# Patient Record
Sex: FEMALE | Race: WHITE | NOT HISPANIC OR LATINO | URBAN - METROPOLITAN AREA
[De-identification: names, ages, dates, MRNs, and addresses within clinical notes are randomized per-mention and may not be internally consistent; named-entity substitution may affect disease eponyms.]

---

## 2017-10-29 ENCOUNTER — EMERGENCY (EMERGENCY)
Facility: HOSPITAL | Age: 27
LOS: 0 days | Discharge: HOME | End: 2017-10-29

## 2017-10-29 DIAGNOSIS — S01.111A LACERATION WITHOUT FOREIGN BODY OF RIGHT EYELID AND PERIOCULAR AREA, INITIAL ENCOUNTER: ICD-10-CM
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
## 2017-10-29 DIAGNOSIS — E11.9 TYPE 2 DIABETES MELLITUS WITHOUT COMPLICATIONS: ICD-10-CM

## 2017-10-29 DIAGNOSIS — Z79.899 OTHER LONG TERM (CURRENT) DRUG THERAPY: ICD-10-CM
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
## 2018-02-02 ENCOUNTER — EMERGENCY (EMERGENCY)
Facility: HOSPITAL | Age: 28
LOS: 0 days | Discharge: HOME | End: 2018-02-03

## 2018-02-02 DIAGNOSIS — E11.9 TYPE 2 DIABETES MELLITUS WITHOUT COMPLICATIONS: ICD-10-CM

## 2018-02-02 DIAGNOSIS — Z79.84 LONG TERM (CURRENT) USE OF ORAL HYPOGLYCEMIC DRUGS: ICD-10-CM

## 2018-02-02 DIAGNOSIS — Z79.899 OTHER LONG TERM (CURRENT) DRUG THERAPY: ICD-10-CM

## 2018-02-02 DIAGNOSIS — N76.5 ULCERATION OF VAGINA: ICD-10-CM

## 2018-04-17 ENCOUNTER — OUTPATIENT (OUTPATIENT)
Dept: OUTPATIENT SERVICES | Facility: HOSPITAL | Age: 28
LOS: 1 days | Discharge: HOME | End: 2018-04-17

## 2018-04-17 DIAGNOSIS — F11.20 OPIOID DEPENDENCE, UNCOMPLICATED: ICD-10-CM

## 2018-05-07 ENCOUNTER — OUTPATIENT (OUTPATIENT)
Dept: OUTPATIENT SERVICES | Facility: HOSPITAL | Age: 28
LOS: 1 days | Discharge: HOME | End: 2018-05-07

## 2018-05-07 DIAGNOSIS — F11.20 OPIOID DEPENDENCE, UNCOMPLICATED: ICD-10-CM

## 2018-06-29 ENCOUNTER — OUTPATIENT (OUTPATIENT)
Dept: OUTPATIENT SERVICES | Facility: HOSPITAL | Age: 28
LOS: 1 days | Discharge: HOME | End: 2018-06-29

## 2018-06-29 DIAGNOSIS — F11.20 OPIOID DEPENDENCE, UNCOMPLICATED: ICD-10-CM

## 2018-08-03 ENCOUNTER — OUTPATIENT (OUTPATIENT)
Dept: OUTPATIENT SERVICES | Facility: HOSPITAL | Age: 28
LOS: 1 days | Discharge: HOME | End: 2018-08-03

## 2018-08-03 DIAGNOSIS — F11.20 OPIOID DEPENDENCE, UNCOMPLICATED: ICD-10-CM

## 2018-09-07 ENCOUNTER — OUTPATIENT (OUTPATIENT)
Dept: OUTPATIENT SERVICES | Facility: HOSPITAL | Age: 28
LOS: 1 days | Discharge: HOME | End: 2018-09-07

## 2018-09-07 DIAGNOSIS — F11.20 OPIOID DEPENDENCE, UNCOMPLICATED: ICD-10-CM

## 2018-10-26 ENCOUNTER — OUTPATIENT (OUTPATIENT)
Dept: OUTPATIENT SERVICES | Facility: HOSPITAL | Age: 28
LOS: 1 days | Discharge: HOME | End: 2018-10-26

## 2018-10-26 DIAGNOSIS — F11.20 OPIOID DEPENDENCE, UNCOMPLICATED: ICD-10-CM

## 2018-11-30 ENCOUNTER — OUTPATIENT (OUTPATIENT)
Dept: OUTPATIENT SERVICES | Facility: HOSPITAL | Age: 28
LOS: 1 days | Discharge: HOME | End: 2018-11-30

## 2018-11-30 DIAGNOSIS — F11.20 OPIOID DEPENDENCE, UNCOMPLICATED: ICD-10-CM

## 2018-12-28 ENCOUNTER — OUTPATIENT (OUTPATIENT)
Dept: OUTPATIENT SERVICES | Facility: HOSPITAL | Age: 28
LOS: 1 days | Discharge: HOME | End: 2018-12-28

## 2018-12-28 DIAGNOSIS — F11.20 OPIOID DEPENDENCE, UNCOMPLICATED: ICD-10-CM

## 2019-01-04 ENCOUNTER — OUTPATIENT (OUTPATIENT)
Dept: OUTPATIENT SERVICES | Facility: HOSPITAL | Age: 29
LOS: 1 days | Discharge: HOME | End: 2019-01-04

## 2019-01-04 DIAGNOSIS — F11.20 OPIOID DEPENDENCE, UNCOMPLICATED: ICD-10-CM

## 2019-01-09 ENCOUNTER — OUTPATIENT (OUTPATIENT)
Dept: OUTPATIENT SERVICES | Facility: HOSPITAL | Age: 29
LOS: 1 days | Discharge: HOME | End: 2019-01-09

## 2019-01-09 DIAGNOSIS — F11.20 OPIOID DEPENDENCE, UNCOMPLICATED: ICD-10-CM

## 2019-01-11 ENCOUNTER — OUTPATIENT (OUTPATIENT)
Dept: OUTPATIENT SERVICES | Facility: HOSPITAL | Age: 29
LOS: 1 days | Discharge: HOME | End: 2019-01-11

## 2019-01-11 DIAGNOSIS — F11.20 OPIOID DEPENDENCE, UNCOMPLICATED: ICD-10-CM

## 2019-01-17 ENCOUNTER — OUTPATIENT (OUTPATIENT)
Dept: OUTPATIENT SERVICES | Facility: HOSPITAL | Age: 29
LOS: 1 days | Discharge: HOME | End: 2019-01-17

## 2019-01-17 DIAGNOSIS — F11.20 OPIOID DEPENDENCE, UNCOMPLICATED: ICD-10-CM

## 2019-01-25 ENCOUNTER — OUTPATIENT (OUTPATIENT)
Dept: OUTPATIENT SERVICES | Facility: HOSPITAL | Age: 29
LOS: 1 days | Discharge: HOME | End: 2019-01-25

## 2019-01-25 DIAGNOSIS — F11.20 OPIOID DEPENDENCE, UNCOMPLICATED: ICD-10-CM

## 2019-02-01 ENCOUNTER — OUTPATIENT (OUTPATIENT)
Dept: OUTPATIENT SERVICES | Facility: HOSPITAL | Age: 29
LOS: 1 days | Discharge: HOME | End: 2019-02-01

## 2019-02-01 DIAGNOSIS — F11.20 OPIOID DEPENDENCE, UNCOMPLICATED: ICD-10-CM

## 2019-02-08 ENCOUNTER — OUTPATIENT (OUTPATIENT)
Dept: OUTPATIENT SERVICES | Facility: HOSPITAL | Age: 29
LOS: 1 days | Discharge: HOME | End: 2019-02-08

## 2019-02-08 DIAGNOSIS — F11.20 OPIOID DEPENDENCE, UNCOMPLICATED: ICD-10-CM

## 2019-02-15 ENCOUNTER — OUTPATIENT (OUTPATIENT)
Dept: OUTPATIENT SERVICES | Facility: HOSPITAL | Age: 29
LOS: 1 days | Discharge: HOME | End: 2019-02-15

## 2019-02-15 DIAGNOSIS — F11.20 OPIOID DEPENDENCE, UNCOMPLICATED: ICD-10-CM

## 2019-02-22 ENCOUNTER — OUTPATIENT (OUTPATIENT)
Dept: OUTPATIENT SERVICES | Facility: HOSPITAL | Age: 29
LOS: 1 days | Discharge: HOME | End: 2019-02-22

## 2019-02-22 DIAGNOSIS — F11.20 OPIOID DEPENDENCE, UNCOMPLICATED: ICD-10-CM

## 2019-03-01 ENCOUNTER — OUTPATIENT (OUTPATIENT)
Dept: OUTPATIENT SERVICES | Facility: HOSPITAL | Age: 29
LOS: 1 days | Discharge: HOME | End: 2019-03-01

## 2019-03-01 DIAGNOSIS — F11.20 OPIOID DEPENDENCE, UNCOMPLICATED: ICD-10-CM

## 2019-03-04 ENCOUNTER — EMERGENCY (EMERGENCY)
Facility: HOSPITAL | Age: 29
LOS: 0 days | Discharge: HOME | End: 2019-03-05
Attending: EMERGENCY MEDICINE | Admitting: EMERGENCY MEDICINE

## 2019-03-04 VITALS
HEART RATE: 99 BPM | SYSTOLIC BLOOD PRESSURE: 125 MMHG | OXYGEN SATURATION: 98 % | DIASTOLIC BLOOD PRESSURE: 82 MMHG | RESPIRATION RATE: 18 BRPM | TEMPERATURE: 99 F

## 2019-03-04 DIAGNOSIS — F17.200 NICOTINE DEPENDENCE, UNSPECIFIED, UNCOMPLICATED: ICD-10-CM

## 2019-03-04 DIAGNOSIS — S02.2XXA FRACTURE OF NASAL BONES, INITIAL ENCOUNTER FOR CLOSED FRACTURE: ICD-10-CM
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
## 2019-03-04 DIAGNOSIS — S01.111A LACERATION WITHOUT FOREIGN BODY OF RIGHT EYELID AND PERIOCULAR AREA, INITIAL ENCOUNTER: ICD-10-CM
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
## 2019-03-04 DIAGNOSIS — T74.11XA ADULT PHYSICAL ABUSE, CONFIRMED, INITIAL ENCOUNTER: ICD-10-CM
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
## 2019-03-04 RX ORDER — ACETAMINOPHEN 500 MG
650 TABLET ORAL ONCE
Qty: 0 | Refills: 0 | Status: COMPLETED | OUTPATIENT
Start: 2019-03-04 | End: 2019-03-04

## 2019-03-04 RX ORDER — TETANUS TOXOID, REDUCED DIPHTHERIA TOXOID AND ACELLULAR PERTUSSIS VACCINE, ADSORBED 5; 2.5; 8; 8; 2.5 [IU]/.5ML; [IU]/.5ML; UG/.5ML; UG/.5ML; UG/.5ML
0.5 SUSPENSION INTRAMUSCULAR ONCE
Qty: 0 | Refills: 0 | Status: DISCONTINUED | OUTPATIENT
Start: 2019-03-04 | End: 2019-03-04

## 2019-03-04 RX ADMIN — Medication 650 MILLIGRAM(S): at 22:14

## 2019-03-04 NOTE — ED PROVIDER NOTE - NSFOLLOWUPINSTRUCTIONS_ED_ALL_ED_FT
Laceration    A laceration is a cut that goes through all of the layers of the skin and into the tissue that is right under the skin. Some lacerations heal on their own. Others need to be closed with skin adhesive strips, skin glue, stitches (sutures), or staples. Proper laceration care minimizes the risk of infection and helps the laceration to heal better.  If non-absorbable stitches or staples have been placed, they must be taken out within the time frame instructed by your healthcare provider.    SEEK IMMEDIATE MEDICAL CARE IF YOU HAVE ANY OF THE FOLLOWING SYMPTOMS: swelling around the wound, worsening pain, drainage from the wound, red streaking going away from your wound, inability to move finger or toe near the laceration, or discoloration of skin near the laceration.     Fracture    A fracture is a break in one of your bones. This can occur from a variety of injuries, especially traumatic ones. Symptoms include pain, bruising, or swelling. Do not use the injured limb. If a fracture is in one of the bones below your waist, do not put weight on that limb unless instructed to do so by your healthcare provider. Crutches or a cane may have been provided. A splint or cast may have been applied by your health care provider. Make sure to keep it dry and follow up with an orthopedist as instructed.    SEEK IMMEDIATE MEDICAL CARE IF YOU HAVE ANY OF THE FOLLOWING SYMPTOMS: numbness, tingling, increasing pain, or weakness in any part of the injured limb.

## 2019-03-04 NOTE — ED PROVIDER NOTE - CARE PROVIDER_API CALL
Melecio Gamez (MD)  [facility]  [address] [place] [address], [address] [place] [address]  Phone: [phone]  Fax: [phone]  Follow Up Time: 1-3 Days    Ej Costello)  Ophthalmology  [facility] [facility] [address]  [place] [address]  Phone: [phone]  Fax: [phone]  Follow Up Time: 1-3 Days
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
## 2019-03-04 NOTE — ED ADULT NURSE NOTE - NSIMPLEMENTINTERV_GEN_ALL_ED
Implemented All Universal Safety Interventions:  [place] to call system. Call bell, personal items and telephone within reach. Instruct patient to call for assistance. Room bathroom lighting operational. Non-slip footwear when patient is off stretcher. Physically safe environment: no spills, clutter or unnecessary equipment. Stretcher in lowest position, wheels locked, appropriate side rails in place.
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
## 2019-03-04 NOTE — ED PROVIDER NOTE - PROVIDER TOKENS
PROVIDER:[TOKEN:[[id number]],FOLLOWUP:[1-3 Days]],PROVIDER:[TOKEN:[[id number]],FOLLOWUP:[1-3 Days]]
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
## 2019-03-04 NOTE — ED PROVIDER NOTE - CARE PROVIDERS DIRECT ADDRESSES
,[email or web address]

## 2019-03-04 NOTE — ED ADULT TRIAGE NOTE - RESPIRATORY RATE (BREATHS/MIN)
Immediate Post- Operative Note        PostOp Diagnosis: Multifocal HCC.       Procedure(s): Pre Y90 visceral angiogram mapping and MAA hepatopulmonary shunt study.       Estimated Blood Loss: Less than 15 ml        Complications: None            8/28/2018     1508 PM     Joe Norris        
18
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
## 2019-03-04 NOTE — ED PROVIDER NOTE - NS ED ROS FT
Review of Systems  Constitutional:  No fever, chills.  Eyes:  No visual changes, or discharge. (+) right eye pain, eyelid laceration  ENMT:  No hearing changes, pain, or discharge. No nasal congestion, discharge, or bleeding. No throat pain, swelling, or difficulty swallowing.  Cardiac:  No chest pain, palpitations.  Respiratory:  No dyspnea, orthopnea, stridor, wheezing, or cough. No hemoptysis.  GI:  No nausea, vomiting, diarrhea, or abdominal pain.   MS:  No back pain.  Skin:  No skin rash, pruritis, jaundice, or lesions.  Neuro:  No headache, dizziness, loss of sensation, or focal weakness.  No change in mental status.   Endocrine: No history of thyroid disease or diabetes.
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
## 2019-03-04 NOTE — ED PROVIDER NOTE - PHYSICAL EXAMINATION
VITAL SIGNS: I have reviewed nursing notes and confirm.  CONSTITUTIONAL: Well-developed; well-nourished; in no acute distress.  SKIN: Skin exam is warm and dry, no acute rash.  HEAD: Normocephalic; atraumatic.  EYES: Conjunctiva and sclera clear. (+) right periorbital ecchymosis. (+) TTP to right inferior orbital rim. (+) 2 lacerations to right eyelid with mild active bleeding. EOMI. No entrapment. VA 20/20 B/L.   ENT: No nasal discharge; airway clear. TMs clear. No septal hematoma.   NECK: Supple; non tender.  CARD: S1, S2 normal; no murmurs, gallops, or rubs. Regular rate and rhythm.  RESP: No wheezes, rales or rhonchi. Speaking in full sentences.   ABD: Normal bowel sounds; soft; non-distended; non-tender; No rebound or guarding. No CVA tenderness.  EXT: Normal ROM. Pelvis stable.   NEURO: Alert, oriented. Grossly unremarkable. No focal deficits. CN II-XII intact. No dysmetria. No ataxia. Sensation intact and equal throughout. Strength 5/5 throughout. Gait steady.

## 2019-03-05 NOTE — ED PROCEDURE NOTE - ATTENDING CONTRIBUTION TO CARE
I was present for and supervised the key/critical aspects of the procedures performed during the care of the patient.--laceration #2 repair

## 2019-03-05 NOTE — ED PROCEDURE NOTE - ATTENDING CONTRIBUTION TO CARE
I was present for and supervised the key/critical aspects of the procedures performed during the care of the patient--laceration repair

## 2019-03-08 ENCOUNTER — OUTPATIENT (OUTPATIENT)
Dept: OUTPATIENT SERVICES | Facility: HOSPITAL | Age: 29
LOS: 1 days | Discharge: HOME | End: 2019-03-08

## 2019-03-08 DIAGNOSIS — F11.20 OPIOID DEPENDENCE, UNCOMPLICATED: ICD-10-CM

## 2019-03-15 ENCOUNTER — OUTPATIENT (OUTPATIENT)
Dept: OUTPATIENT SERVICES | Facility: HOSPITAL | Age: 29
LOS: 1 days | Discharge: HOME | End: 2019-03-15

## 2019-03-15 DIAGNOSIS — F11.20 OPIOID DEPENDENCE, UNCOMPLICATED: ICD-10-CM

## 2019-03-29 ENCOUNTER — OUTPATIENT (OUTPATIENT)
Dept: OUTPATIENT SERVICES | Facility: HOSPITAL | Age: 29
LOS: 1 days | Discharge: HOME | End: 2019-03-29

## 2019-03-29 DIAGNOSIS — F11.20 OPIOID DEPENDENCE, UNCOMPLICATED: ICD-10-CM

## 2019-04-05 ENCOUNTER — OUTPATIENT (OUTPATIENT)
Dept: OUTPATIENT SERVICES | Facility: HOSPITAL | Age: 29
LOS: 1 days | Discharge: HOME | End: 2019-04-05

## 2019-04-05 DIAGNOSIS — F11.20 OPIOID DEPENDENCE, UNCOMPLICATED: ICD-10-CM

## 2019-04-12 ENCOUNTER — OUTPATIENT (OUTPATIENT)
Dept: OUTPATIENT SERVICES | Facility: HOSPITAL | Age: 29
LOS: 1 days | Discharge: HOME | End: 2019-04-12

## 2019-04-12 DIAGNOSIS — F11.20 OPIOID DEPENDENCE, UNCOMPLICATED: ICD-10-CM

## 2019-04-19 ENCOUNTER — OUTPATIENT (OUTPATIENT)
Dept: OUTPATIENT SERVICES | Facility: HOSPITAL | Age: 29
LOS: 1 days | Discharge: HOME | End: 2019-04-19

## 2019-04-19 DIAGNOSIS — F11.20 OPIOID DEPENDENCE, UNCOMPLICATED: ICD-10-CM

## 2019-04-26 ENCOUNTER — OUTPATIENT (OUTPATIENT)
Dept: OUTPATIENT SERVICES | Facility: HOSPITAL | Age: 29
LOS: 1 days | Discharge: HOME | End: 2019-04-26

## 2019-04-26 DIAGNOSIS — F11.20 OPIOID DEPENDENCE, UNCOMPLICATED: ICD-10-CM

## 2019-04-30 ENCOUNTER — OUTPATIENT (OUTPATIENT)
Dept: OUTPATIENT SERVICES | Facility: HOSPITAL | Age: 29
LOS: 1 days | Discharge: HOME | End: 2019-04-30

## 2019-04-30 DIAGNOSIS — F11.20 OPIOID DEPENDENCE, UNCOMPLICATED: ICD-10-CM

## 2019-05-03 ENCOUNTER — OUTPATIENT (OUTPATIENT)
Dept: OUTPATIENT SERVICES | Facility: HOSPITAL | Age: 29
LOS: 1 days | Discharge: HOME | End: 2019-05-03

## 2019-05-03 DIAGNOSIS — F11.20 OPIOID DEPENDENCE, UNCOMPLICATED: ICD-10-CM

## 2019-05-10 ENCOUNTER — OUTPATIENT (OUTPATIENT)
Dept: OUTPATIENT SERVICES | Facility: HOSPITAL | Age: 29
LOS: 1 days | Discharge: HOME | End: 2019-05-10

## 2019-05-10 DIAGNOSIS — F11.20 OPIOID DEPENDENCE, UNCOMPLICATED: ICD-10-CM

## 2019-05-31 ENCOUNTER — OUTPATIENT (OUTPATIENT)
Dept: OUTPATIENT SERVICES | Facility: HOSPITAL | Age: 29
LOS: 1 days | Discharge: HOME | End: 2019-05-31

## 2019-05-31 DIAGNOSIS — F11.20 OPIOID DEPENDENCE, UNCOMPLICATED: ICD-10-CM

## 2019-06-13 ENCOUNTER — OUTPATIENT (OUTPATIENT)
Dept: OUTPATIENT SERVICES | Facility: HOSPITAL | Age: 29
LOS: 1 days | Discharge: HOME | End: 2019-06-13

## 2019-06-13 DIAGNOSIS — F11.20 OPIOID DEPENDENCE, UNCOMPLICATED: ICD-10-CM

## 2019-06-21 ENCOUNTER — OUTPATIENT (OUTPATIENT)
Dept: OUTPATIENT SERVICES | Facility: HOSPITAL | Age: 29
LOS: 1 days | Discharge: HOME | End: 2019-06-21

## 2019-06-21 DIAGNOSIS — F11.20 OPIOID DEPENDENCE, UNCOMPLICATED: ICD-10-CM

## 2019-06-28 ENCOUNTER — OUTPATIENT (OUTPATIENT)
Dept: OUTPATIENT SERVICES | Facility: HOSPITAL | Age: 29
LOS: 1 days | Discharge: HOME | End: 2019-06-28

## 2019-06-28 DIAGNOSIS — F11.20 OPIOID DEPENDENCE, UNCOMPLICATED: ICD-10-CM

## 2019-07-05 ENCOUNTER — OUTPATIENT (OUTPATIENT)
Dept: OUTPATIENT SERVICES | Facility: HOSPITAL | Age: 29
LOS: 1 days | Discharge: HOME | End: 2019-07-05

## 2019-07-05 DIAGNOSIS — F11.20 OPIOID DEPENDENCE, UNCOMPLICATED: ICD-10-CM

## 2019-07-12 ENCOUNTER — OUTPATIENT (OUTPATIENT)
Dept: OUTPATIENT SERVICES | Facility: HOSPITAL | Age: 29
LOS: 1 days | Discharge: HOME | End: 2019-07-12

## 2019-07-12 DIAGNOSIS — F11.20 OPIOID DEPENDENCE, UNCOMPLICATED: ICD-10-CM

## 2019-07-19 ENCOUNTER — OUTPATIENT (OUTPATIENT)
Dept: OUTPATIENT SERVICES | Facility: HOSPITAL | Age: 29
LOS: 1 days | Discharge: HOME | End: 2019-07-19

## 2019-07-19 DIAGNOSIS — F11.20 OPIOID DEPENDENCE, UNCOMPLICATED: ICD-10-CM

## 2019-07-22 ENCOUNTER — OUTPATIENT (OUTPATIENT)
Dept: OUTPATIENT SERVICES | Facility: HOSPITAL | Age: 29
LOS: 1 days | Discharge: HOME | End: 2019-07-22
Payer: MEDICAID

## 2019-07-22 DIAGNOSIS — F11.20 OPIOID DEPENDENCE, UNCOMPLICATED: ICD-10-CM

## 2019-07-22 PROCEDURE — 99214 OFFICE O/P EST MOD 30 MIN: CPT

## 2019-08-02 ENCOUNTER — OUTPATIENT (OUTPATIENT)
Dept: OUTPATIENT SERVICES | Facility: HOSPITAL | Age: 29
LOS: 1 days | Discharge: HOME | End: 2019-08-02

## 2019-08-02 DIAGNOSIS — F11.20 OPIOID DEPENDENCE, UNCOMPLICATED: ICD-10-CM

## 2019-08-19 ENCOUNTER — OUTPATIENT (OUTPATIENT)
Dept: OUTPATIENT SERVICES | Facility: HOSPITAL | Age: 29
LOS: 1 days | Discharge: HOME | End: 2019-08-19
Payer: MEDICAID

## 2019-08-19 DIAGNOSIS — F11.20 OPIOID DEPENDENCE, UNCOMPLICATED: ICD-10-CM

## 2019-08-19 PROCEDURE — 99214 OFFICE O/P EST MOD 30 MIN: CPT

## 2019-08-30 ENCOUNTER — OUTPATIENT (OUTPATIENT)
Dept: OUTPATIENT SERVICES | Facility: HOSPITAL | Age: 29
LOS: 1 days | Discharge: HOME | End: 2019-08-30

## 2019-08-30 DIAGNOSIS — F11.20 OPIOID DEPENDENCE, UNCOMPLICATED: ICD-10-CM

## 2019-09-01 ENCOUNTER — OUTPATIENT (OUTPATIENT)
Dept: OUTPATIENT SERVICES | Facility: HOSPITAL | Age: 29
LOS: 1 days | End: 2019-09-01
Payer: MEDICAID

## 2019-09-01 ENCOUNTER — EMERGENCY (EMERGENCY)
Facility: HOSPITAL | Age: 29
LOS: 0 days | Discharge: HOME | End: 2019-09-01
Attending: EMERGENCY MEDICINE | Admitting: EMERGENCY MEDICINE
Payer: MEDICAID

## 2019-09-01 VITALS
WEIGHT: 160.06 LBS | RESPIRATION RATE: 18 BRPM | HEART RATE: 84 BPM | OXYGEN SATURATION: 100 % | HEIGHT: 64 IN | TEMPERATURE: 97 F | SYSTOLIC BLOOD PRESSURE: 114 MMHG | DIASTOLIC BLOOD PRESSURE: 78 MMHG
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
## 2019-09-01 DIAGNOSIS — F12.10 CANNABIS ABUSE, UNCOMPLICATED: ICD-10-CM
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
## 2019-09-01 DIAGNOSIS — T40.2X2A POISONING BY OTHER OPIOIDS, INTENTIONAL SELF-HARM, INITIAL ENCOUNTER: ICD-10-CM

## 2019-09-01 DIAGNOSIS — T42.4X2A POISONING BY BENZODIAZEPINES, INTENTIONAL SELF-HARM, INITIAL ENCOUNTER: ICD-10-CM

## 2019-09-01 DIAGNOSIS — J02.9 ACUTE PHARYNGITIS, UNSPECIFIED: ICD-10-CM

## 2019-09-01 DIAGNOSIS — F14.10 COCAINE ABUSE, UNCOMPLICATED: ICD-10-CM

## 2019-09-01 DIAGNOSIS — F13.180: ICD-10-CM

## 2019-09-01 DIAGNOSIS — F39 UNSPECIFIED MOOD [AFFECTIVE] DISORDER: ICD-10-CM

## 2019-09-01 DIAGNOSIS — T14.91XA SUICIDE ATTEMPT, INITIAL ENCOUNTER: ICD-10-CM

## 2019-09-01 DIAGNOSIS — T40.7X2A: ICD-10-CM

## 2019-09-01 DIAGNOSIS — F10.10 ALCOHOL ABUSE, UNCOMPLICATED: ICD-10-CM
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
## 2019-09-01 DIAGNOSIS — F19.94 OTHER PSYCHOACTIVE SUBSTANCE USE, UNSPECIFIED WITH PSYCHOACTIVE SUBSTANCE-INDUCED MOOD DISORDER: ICD-10-CM

## 2019-09-01 LAB
ALBUMIN SERPL ELPH-MCNC: 4.3 G/DL — SIGNIFICANT CHANGE UP (ref 3.5–5.2)
ALP SERPL-CCNC: 75 U/L — SIGNIFICANT CHANGE UP (ref 30–115)
ALT FLD-CCNC: 17 U/L — SIGNIFICANT CHANGE UP (ref 0–41)
ANION GAP SERPL CALC-SCNC: 10 MMOL/L — SIGNIFICANT CHANGE UP (ref 7–14)
APAP SERPL-MCNC: <5 UG/ML — LOW (ref 10–30)
AST SERPL-CCNC: 19 U/L — SIGNIFICANT CHANGE UP (ref 0–41)
BASOPHILS # BLD AUTO: 0.07 K/UL — SIGNIFICANT CHANGE UP (ref 0–0.2)
BASOPHILS NFR BLD AUTO: 1.2 % — HIGH (ref 0–1)
BILIRUB SERPL-MCNC: 0.4 MG/DL — SIGNIFICANT CHANGE UP (ref 0.2–1.2)
BUN SERPL-MCNC: 19 MG/DL — SIGNIFICANT CHANGE UP (ref 10–20)
CALCIUM SERPL-MCNC: 8.8 MG/DL — SIGNIFICANT CHANGE UP (ref 8.5–10.1)
CHLORIDE SERPL-SCNC: 105 MMOL/L — SIGNIFICANT CHANGE UP (ref 98–110)
CO2 SERPL-SCNC: 25 MMOL/L — SIGNIFICANT CHANGE UP (ref 17–32)
CREAT SERPL-MCNC: 0.6 MG/DL — LOW (ref 0.7–1.5)
EOSINOPHIL # BLD AUTO: 0.3 K/UL — SIGNIFICANT CHANGE UP (ref 0–0.7)
EOSINOPHIL NFR BLD AUTO: 5.3 % — SIGNIFICANT CHANGE UP (ref 0–8)
ETHANOL SERPL-MCNC: <10 MG/DL — SIGNIFICANT CHANGE UP
GLUCOSE SERPL-MCNC: 87 MG/DL — SIGNIFICANT CHANGE UP (ref 70–99)
HCT VFR BLD CALC: 36.9 % — LOW (ref 37–47)
HGB BLD-MCNC: 12.3 G/DL — SIGNIFICANT CHANGE UP (ref 12–16)
IMM GRANULOCYTES NFR BLD AUTO: 0.2 % — SIGNIFICANT CHANGE UP (ref 0.1–0.3)
LYMPHOCYTES # BLD AUTO: 2.19 K/UL — SIGNIFICANT CHANGE UP (ref 1.2–3.4)
LYMPHOCYTES # BLD AUTO: 38.5 % — SIGNIFICANT CHANGE UP (ref 20.5–51.1)
MCHC RBC-ENTMCNC: 30.4 PG — SIGNIFICANT CHANGE UP (ref 27–31)
MCHC RBC-ENTMCNC: 33.3 G/DL — SIGNIFICANT CHANGE UP (ref 32–37)
MCV RBC AUTO: 91.1 FL — SIGNIFICANT CHANGE UP (ref 81–99)
MONOCYTES # BLD AUTO: 0.53 K/UL — SIGNIFICANT CHANGE UP (ref 0.1–0.6)
MONOCYTES NFR BLD AUTO: 9.3 % — SIGNIFICANT CHANGE UP (ref 1.7–9.3)
NEUTROPHILS # BLD AUTO: 2.59 K/UL — SIGNIFICANT CHANGE UP (ref 1.4–6.5)
NEUTROPHILS NFR BLD AUTO: 45.5 % — SIGNIFICANT CHANGE UP (ref 42.2–75.2)
NRBC # BLD: 0 /100 WBCS — SIGNIFICANT CHANGE UP (ref 0–0)
PLATELET # BLD AUTO: 228 K/UL — SIGNIFICANT CHANGE UP (ref 130–400)
POTASSIUM SERPL-MCNC: 3.8 MMOL/L — SIGNIFICANT CHANGE UP (ref 3.5–5)
POTASSIUM SERPL-SCNC: 3.8 MMOL/L — SIGNIFICANT CHANGE UP (ref 3.5–5)
PROT SERPL-MCNC: 6.4 G/DL — SIGNIFICANT CHANGE UP (ref 6–8)
RBC # BLD: 4.05 M/UL — LOW (ref 4.2–5.4)
RBC # FLD: 12.6 % — SIGNIFICANT CHANGE UP (ref 11.5–14.5)
SALICYLATES SERPL-MCNC: <0.3 MG/DL — LOW (ref 4–30)
SODIUM SERPL-SCNC: 140 MMOL/L — SIGNIFICANT CHANGE UP (ref 135–146)
WBC # BLD: 5.69 K/UL — SIGNIFICANT CHANGE UP (ref 4.8–10.8)
WBC # FLD AUTO: 5.69 K/UL — SIGNIFICANT CHANGE UP (ref 4.8–10.8)

## 2019-09-01 PROCEDURE — 99284 EMERGENCY DEPT VISIT MOD MDM: CPT

## 2019-09-01 PROCEDURE — G9001: CPT

## 2019-09-01 PROCEDURE — 71045 X-RAY EXAM CHEST 1 VIEW: CPT | Mod: 26

## 2019-09-01 RX ORDER — DEXAMETHASONE 0.5 MG/5ML
10 ELIXIR ORAL ONCE
Refills: 0 | Status: COMPLETED | OUTPATIENT
Start: 2019-09-01 | End: 2019-09-01

## 2019-09-01 RX ORDER — NALOXONE HYDROCHLORIDE 4 MG/.1ML
0.2 SPRAY NASAL ONCE
Refills: 0 | Status: COMPLETED | OUTPATIENT
Start: 2019-09-01 | End: 2019-09-01

## 2019-09-01 RX ORDER — BUPRENORPHINE AND NALOXONE 2; .5 MG/1; MG/1
2 TABLET SUBLINGUAL ONCE
Refills: 0 | Status: DISCONTINUED | OUTPATIENT
Start: 2019-09-01 | End: 2019-09-01

## 2019-09-01 RX ORDER — BUPRENORPHINE AND NALOXONE 2; .5 MG/1; MG/1
1 TABLET SUBLINGUAL ONCE
Refills: 0 | Status: DISCONTINUED | OUTPATIENT
Start: 2019-09-01 | End: 2019-09-01

## 2019-09-01 RX ADMIN — NALOXONE HYDROCHLORIDE 0.2 MILLIGRAM(S): 4 SPRAY NASAL at 05:44

## 2019-09-01 RX ADMIN — Medication 1 TABLET(S): at 11:43

## 2019-09-01 RX ADMIN — Medication 10 MILLIGRAM(S): at 11:43

## 2019-09-01 RX ADMIN — BUPRENORPHINE AND NALOXONE 2 TABLET(S): 2; .5 TABLET SUBLINGUAL at 14:20

## 2019-09-01 RX ADMIN — NALOXONE HYDROCHLORIDE 0.2 MILLIGRAM(S): 4 SPRAY NASAL at 06:31

## 2019-09-01 NOTE — ED PROVIDER NOTE - NSFOLLOWUPCLINICS_GEN_ALL_ED_FT
[facility]  OP Mental Health  [address] [place] [id number]  Phone: [phone]  Fax:   Follow Up Time: 1-3 Days

## 2019-09-01 NOTE — ED PROVIDER NOTE - NSFOLLOWUPINSTRUCTIONS_ED_ALL_ED_FT
Substance Use Disorder  Substance use disorder happens when a person's repeated use of drugs or alcohol interferes with his or her ability to be productive. This disorder can cause problems with your mental and physical health. It can affect your ability to have healthy relationships, and it can keep you from being able to meet your responsibilities at work, home, or school. It can also lead to addiction.    The most commonly abused substances include:    Alcohol.  Tobacco.  Marijuana.  Stimulants, such as cocaine and methamphetamine.  Hallucinogens, such as LSD and PCP.  Opioids, such as some prescription pain medicines and heroin.    What are the causes?  This condition may develop due to social, psychological, or physical reasons. Causes include:    Stress.  Abuse.  Peer pressure.  Anxiety.  Depression.    What increases the risk?  This condition is more likely to develop in people who:    Are stressed.  Have been abused.  Have a mental health disorder, such as depression.  Are born with certain genes.    What are the signs or symptoms?  Symptoms of this condition include:    Using the substance for longer periods of time or at a higher dosage than what is normal or intended.  Having a lasting desire to use the substance.  Being unable to slow down or stop your use of the substance.  Spending an abnormal amount of time seeking the substance, using the substance, or recovering from using the substance.  Craving the substance.  Substance use that:    Interferes with your work, school, or home life.  Interferes with your personal and social relationships.  Makes you give up activities that you once enjoyed or found important.    Using the substance even though:    You know it is dangerous or bad for your health.  You know it is causing problems in your life.    Needing more and more of the substance to get the same effect (developing tolerance).  Experiencing physical symptoms if you do not use the substance (withdrawal).  Using the substance to avoid withdrawal.    How is this diagnosed?  This condition may be diagnosed based on:    Your history of substance use.  The way in which substance abuse affects your life.  Having at least two symptoms of substance use disorder within a 12-month period.    Your health care provider may also test your blood and urine for alcohol and drugs.    How is this treated?  ImageThis condition may be treated by:    Stopping substance use safely. This may require taking medicines and being closely observed for several days.  Taking part in group and individual counseling from mental health providers who help people with substance use disorder.  Staying at a residential treatment center for several days or weeks.  Attending daily counseling sessions at a treatment center.  Taking medicine as told by your health care provider:    To ease symptoms and prevent complications during withdrawal.  To treat other mental health issues, such as depression or anxiety.  To block cravings by causing the same effects as the substance.  To block the effects of the substance or replace good sensations with unpleasant ones.    Going to a support group to share your experience with others who are going through the same thing. These groups are an important part of long-term recovery for many people. They include 12-step programs like Alcoholics Anonymous and Narcotics Anonymous.    Recovery can be a long process. Many people who undergo treatment start using the substance again after stopping. This is called a relapse. If you have a relapse, that does not mean that treatment will not work.    Follow these instructions at home:  Take over-the-counter and prescription medicines only as told by your health care provider.  Do not use any drugs or alcohol.  Keep all follow-up visits as told by your health care provider. This is important. This includes continuing to work with therapists, health care providers, and support groups.  Contact a health care provider if:  You cannot take your medicines as told.  Your symptoms get worse.  You have trouble resisting the urge to use drugs or alcohol.  Get help right away if:  You have serious thoughts about hurting yourself or someone else.  You have a relapse.  This information is not intended to replace advice given to you by your health care provider. Make sure you discuss any questions you have with your health care provider.    Please check this website for help finding local Buprenorphine (Suboxone) providers or to find out if your PMD can prescribe Buprenorphine (Suboxone).  https://[email or web address].gov/medication-assisted-treatment/practitioner-program-data/treatment-practitioner-      Pharyngitis    Pharyngitis is inflammation of your pharynx, which is typically caused by a viral or bacterial infection. Pharyngitis can be contagious and may spread from person to person through intimate contact, coughing, sneezing, or sharing personal items and utensils. Symptoms of pharyngitis may include sore throat, fever, headache, or swollen lymph nodes. If you are prescribed antibiotics, make sure you finish them even if you start to feel better. Gargle with salt water as often as every 1-2 hours to soothe your throat. Throat lozenges (if you are not at risk for choking) or sprays may be used to soothe your throat.    SEEK IMMEDIATE MEDICAL CARE IF YOU HAVE ANY OF THE FOLLOWING SYMPTOMS: neck stiffness, drooling, hoarseness or change in voice, inability to swallow liquids, vomiting, or trouble breathing.
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
## 2019-09-01 NOTE — ED PROVIDER NOTE - PATIENT PORTAL LINK FT
You can access the FollowMyHealth Patient Portal offered by [facility] by registering at the following website: [email or web address]. By joining [employer]’s FollowMyHealth portal, you will also be able to view your health information using other applications (apps) compatible with our system.
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
## 2019-09-01 NOTE — ED BEHAVIORAL HEALTH ASSESSMENT NOTE - DETAILS
pt has sucidal thought of killing her self by running car gas in car garage maternal uncle  has bipolar and grand mother  and mother has depression called [facility] for bed availability agreed

## 2019-09-01 NOTE — ED ADULT NURSE REASSESSMENT NOTE - NS ED NURSE REASSESS COMMENT FT1
evie from relay program came to see pt. pt was too lethargic to answer questions. will educate pt with narcan kit once more awake.

## 2019-09-01 NOTE — ED BEHAVIORAL HEALTH ASSESSMENT NOTE - AXIS IV
Problem related to social environment/Other psychosocial and environmental problems/Problems with interaction with legal system

## 2019-09-01 NOTE — ED ADULT NURSE REASSESSMENT NOTE - NS ED NURSE REASSESS COMMENT FT1
Pt difficult to arouse; Capnography monitoring put in place. MD Neil made aware; pt assessed. Pt given 0.2mg Narcan as per MD orders. Pt unresponsive to medication. Continued monitoring. Pt relay program called; Basim oMra to respond as pt.
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
## 2019-09-01 NOTE — CHART NOTE - NSCHARTNOTEFT_GEN_A_CORE
patient is on psych hold  for  forensic  transfer to [facility]    writer called  psych ER  [facility]  [phone] and spoke with psychiatrist  Dr Rayo  who  said that there is no  forensic  psych bed available  and recommended to  follow up  on  Tuesday  for bed availability

## 2019-09-01 NOTE — ED PROVIDER NOTE - CARE PLAN
Principal Discharge DX:	Mood disorder  Secondary Diagnosis:	Substance abuse Principal Discharge DX:	Mood disorder  Secondary Diagnosis:	Substance abuse  Secondary Diagnosis:	Overdose  Secondary Diagnosis:	Suicidal ideation  Secondary Diagnosis:	Pharyngitis

## 2019-09-01 NOTE — ED BEHAVIORAL HEALTH ASSESSMENT NOTE - DESCRIPTION
overnight , pt was very sedated and  Narcan injection was given. She is under arrest none pt states that  she did her undergraduate degree at [employer] and has law dgree form [employer] , adam unemployed , living with her mother
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
## 2019-09-01 NOTE — ED BEHAVIORAL HEALTH ASSESSMENT NOTE - RISK ASSESSMENT
patient is feeling depress , chronic drug and alcohol abuse , on going psychosocial  issues , broke up with her girlfriend , under arrest , feeling depress ,and suicidal is danger to slef and others  and she needs in patient admission for stabilization and treatment planning

## 2019-09-01 NOTE — ED ADULT NURSE REASSESSMENT NOTE - NS ED NURSE REASSESS COMMENT FT1
As per [facility] officer Christos; pt was found with 1 bag cocaine, Vicodin, Xanax and majorana vaporizer.
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
## 2019-09-01 NOTE — ED BEHAVIORAL HEALTH ASSESSMENT NOTE - TELEPSYCHIATRY?
No -Patient with 4-5 month history of dyspnea on exertion, palpitations, dizziness and lightheadedness found to be in complete heart block with HRs in the 30s-40s.  -EP consulted-TVP placed, was for PPM today but spiked fever, needs to be afebrile for 24 hours prior to procedure, possilbe PPM tomorrow  -NPO after midnight for PPM tomorrow  -Obtain echocardiogram  -TSH and HbA1C within normal limits and lipid profile with slightly elevated triglycerides and decreased HDL -Patient with 4-5 month history of dyspnea on exertion, palpitations, dizziness and lightheadedness found to be in complete heart block with HRs in the 30s-40s.  -EP consulted-TVP placed, was for PPM but spiked multiple fevers, needs to be afebrile for 24 hours prior to procedure, possilbe PPM tomorrow  -NPO after midnight for PPM tomorrow  -TSH and HbA1C within normal limits and lipid profile with slightly elevated triglycerides and decreased HDL

## 2019-09-01 NOTE — ED ADULT NURSE NOTE - NSIMPLEMENTINTERV_GEN_ALL_ED
Implemented All Fall Risk Interventions:  [place] to call system. Call bell, personal items and telephone within reach. Instruct patient to call for assistance. Room bathroom lighting operational. Non-slip footwear when patient is off stretcher. Physically safe environment: no spills, clutter or unnecessary equipment. Stretcher in lowest position, wheels locked, appropriate side rails in place. Provide visual cue, wrist band, yellow gown, etc. Monitor gait and stability. Monitor for mental status changes and reorient to person, place, and time. Review medications for side effects contributing to fall risk. Reinforce activity limits and safety measures with patient and family.

## 2019-09-01 NOTE — ED BEHAVIORAL HEALTH ASSESSMENT NOTE - NS ED BHA REVIEW OF ED CHART AVAILABLE IMAGING REVIEWED
HPI


Chief Complaint:  Medical Clearance


Time Seen by Provider:  12:58


Travel History


International Travel<30 days:  No


Contact w/Intl Traveler<30days:  No


Traveled to known affect area:  No





History of Present Illness


HPI


18-year-old male presents emergency department requesting a work release to 

return back to work today after having the flu last week.  He is complaining of 

sore throat that started yesterday.  He has history of strep throat.  Since 

symptoms feel like the start of strep throat.  Denies lump in throat, 

difficulty swallowing, unusual drooling.  Reports painful swallowing.  Denies 

fever, headache, vomiting, abdominal pain.  He is not taking any medications or 

tried any treatments to alleviate his symptoms.  Rates her throat 5/10.  

Describes as a burning sensation.  No known relieving factors.  Aggravating 

with swallowing.  No known allergies.  No primary care provider.  Has no other 

medical complaints.  No other modifying factors or associated signs and 

symptoms.





PFSH


Past Medical History


Hx Anticoagulant Therapy:  No


Asthma:  Yes


Diabetes:  No


Respiratory:  Yes (ASTHMA)





Social History


Alcohol Use:  No


Tobacco Use:  Yes (1 PPD)


Substance Use:  No





Allergies-Medications


(Allergen,Severity, Reaction):  


Coded Allergies:  


     No Known Allergies (Unverified , 6/20/17)


Reported Meds & Prescriptions





Reported Meds & Active Scripts


Active


Zofran (Ondansetron HCl) 4 Mg Tab 4 Mg PO Q6HR PRN








Review of Systems


Except as stated in HPI:  all other systems reviewed are Neg





Physical Exam


Narrative


GENERAL: Well-nourished, well-developed  male patient, in no acute 

distress; afebrile


SKIN: Warm and dry.  No rash.


HEAD: Atraumatic. Normocephalic. 


EYES: Pupils equal and round at 3 mm with brisk reaction. No scleral icterus. 

No injection or drainage.  PERRLA. 


ENT: Mucosa pink and dry.  Pharynx with 2+ tonsils;  with erythema and edema; 

without exudate. No  Uvular edema. No uvular, palatal, or tonsillar deviation.  

Airway patent.  Voice is hoarse.


EARS: Bilateral pinnae and external canals appear within normal limits. 

Bilateral tympanic membranes without  erythema, dullness or perforation..


NECK: Trachea midline.  Anterior cervical lymphadenopathy and tenderness.  


CARDIOVASCULAR: Regular rate.


RESPIRATORY: No accessory muscle use


GASTROINTESTINAL: Flat.


MUSCULOSKELETAL: No obvious deformities. No clubbing.  No cyanosis.  No edema. 


NEUROLOGICAL: Awake and alert.  Oriented 3.  No obvious cranial nerve 

deficits.  Motor grossly within normal limits. Normal speech.  Moves all 

extremities. 


PSYCHIATRIC: Appropriate mood and affect; insight and judgment normal.





Data


Data


Last Documented VS





Vital Signs








  Date Time  Temp Pulse Resp B/P (MAP) Pulse Ox O2 Delivery O2 Flow Rate FiO2


 


1/7/18 12:37 97.7 90 16 130/70 (90) 99   








Orders





 Orders


Group A Rapid Strep Screen (1/7/18 13:02)


Strep Culture (Group A) (1/7/18 13:09)








MDM


Medical Decision Making


Medical Screen Exam Complete:  Yes


Emergency Medical Condition:  Yes


Medical Record Reviewed:  Yes


Differential Diagnosis


Viral pharyngitis, strep pharyngitis, sore throat, medical clearance


Narrative Course


18-year-old male with sore throat since yesterday.  The patient pain medication 

and declined.  Rapid strep ordered.


1337:  Rapid strep negative.  Work release provided.  Instructed patient to 

follow up with primary care provider.  Patient verbalizes understanding and 

agreement with treatment plan.  Patient is medically cleared and stable for 

discharge.  Discussed reasons to return to the emergency department.  Patient 

agrees with treatment plan.  The patients vital signs are stable and the 

patient is stable for outpatient follow-up and treatment.  Patient discharged 

home, stable and in no acute distress.





Diagnosis





 Primary Impression:  


 Sore throat (viral)


Referrals:  


[facility]





Primary Care Physician


Patient Instructions:  General Instructions, Pharyngitis (ED)


Departure Forms:  Tests/Procedures, Work Release   Enter return to work date:  

Jan 7, 2018





***Additional Instructions:  


Throw away and change your toothbrush 24 hours


Get plenty of sleep/rest


Rest your voice


Drink plenty of fluids to prevent dehydration


Use warm saltwater gargles to soothe throat pain


Use an air humidifier/turn off ceiling fans


Use throat lozenges as needed for sore throat


Use ibuprofen or acetaminophen as needed to relieve pain and fever


Follow-up with your primary care provider within 2-4 days


Return immediately to the emergency department with worsening of symptoms


***Med/Other Pt SpecificInfo:  No Change to Meds, No Meds Exist/No RX given


Disposition:  01 DISCHARGE HOME


Condition:  Stable











Malinda Sandoval Jan 7, 2018 13:07
None available

## 2019-09-02 VITALS
TEMPERATURE: 97 F | DIASTOLIC BLOOD PRESSURE: 57 MMHG | HEART RATE: 72 BPM | RESPIRATION RATE: 18 BRPM | OXYGEN SATURATION: 100 % | SYSTOLIC BLOOD PRESSURE: 107 MMHG

## 2019-09-02 PROCEDURE — 90792 PSYCH DIAG EVAL W/MED SRVCS: CPT
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
## 2019-09-12 DIAGNOSIS — Z71.89 OTHER SPECIFIED COUNSELING: ICD-10-CM

## 2019-12-29 NOTE — ED ADULT TRIAGE NOTE - BMI (KG/M2)
[facility] EMERGENCY DEPT 
ONE  [address] [facility] [address] 
[phone] Work/School Note Date: 12/29/2019 To Whom It May concern: 
 
Justice Lopez was seen and treated today in the emergency room by the following provider(s): 
Attending Provider: Dima Steven MD. Justice Lopez may return to work on 12/30. Sincerely, Anna Kirkpatrick MD 
 
 
 
 27.5
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
## 2020-11-12 ENCOUNTER — EMERGENCY (EMERGENCY)
Facility: HOSPITAL | Age: 30
LOS: 0 days | Discharge: HOME | End: 2020-11-13
Attending: EMERGENCY MEDICINE | Admitting: EMERGENCY MEDICINE
Payer: MEDICAID

## 2020-11-12 VITALS
WEIGHT: 154.98 LBS | DIASTOLIC BLOOD PRESSURE: 68 MMHG | HEART RATE: 82 BPM | OXYGEN SATURATION: 100 % | TEMPERATURE: 98 F | SYSTOLIC BLOOD PRESSURE: 107 MMHG | HEIGHT: 64 IN | RESPIRATION RATE: 20 BRPM

## 2020-11-12 DIAGNOSIS — R41.82 ALTERED MENTAL STATUS, UNSPECIFIED: ICD-10-CM
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
## 2020-11-12 DIAGNOSIS — F11.929 OPIOID USE, UNSPECIFIED WITH INTOXICATION, UNSPECIFIED: ICD-10-CM
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
## 2020-11-12 LAB
ALBUMIN SERPL ELPH-MCNC: 4 G/DL — SIGNIFICANT CHANGE UP (ref 3.5–5.2)
ALP SERPL-CCNC: 65 U/L — SIGNIFICANT CHANGE UP (ref 30–115)
ALT FLD-CCNC: 11 U/L — SIGNIFICANT CHANGE UP (ref 0–41)
ANION GAP SERPL CALC-SCNC: 8 MMOL/L — SIGNIFICANT CHANGE UP (ref 7–14)
APAP SERPL-MCNC: <5 UG/ML — LOW (ref 10–30)
AST SERPL-CCNC: 15 U/L — SIGNIFICANT CHANGE UP (ref 0–41)
BASOPHILS # BLD AUTO: 0.04 K/UL — SIGNIFICANT CHANGE UP (ref 0–0.2)
BASOPHILS NFR BLD AUTO: 0.9 % — SIGNIFICANT CHANGE UP (ref 0–1)
BILIRUB SERPL-MCNC: <0.2 MG/DL — SIGNIFICANT CHANGE UP (ref 0.2–1.2)
BUN SERPL-MCNC: 18 MG/DL — SIGNIFICANT CHANGE UP (ref 10–20)
CALCIUM SERPL-MCNC: 8.7 MG/DL — SIGNIFICANT CHANGE UP (ref 8.5–10.1)
CHLORIDE SERPL-SCNC: 108 MMOL/L — SIGNIFICANT CHANGE UP (ref 98–110)
CO2 SERPL-SCNC: 26 MMOL/L — SIGNIFICANT CHANGE UP (ref 17–32)
CREAT SERPL-MCNC: 0.6 MG/DL — LOW (ref 0.7–1.5)
EOSINOPHIL # BLD AUTO: 0.18 K/UL — SIGNIFICANT CHANGE UP (ref 0–0.7)
EOSINOPHIL NFR BLD AUTO: 4.1 % — SIGNIFICANT CHANGE UP (ref 0–8)
ETHANOL SERPL-MCNC: <10 MG/DL — SIGNIFICANT CHANGE UP
GLUCOSE SERPL-MCNC: 109 MG/DL — HIGH (ref 70–99)
HCT VFR BLD CALC: 35.4 % — LOW (ref 37–47)
HGB BLD-MCNC: 11.8 G/DL — LOW (ref 12–16)
IMM GRANULOCYTES NFR BLD AUTO: 0.2 % — SIGNIFICANT CHANGE UP (ref 0.1–0.3)
LYMPHOCYTES # BLD AUTO: 2.16 K/UL — SIGNIFICANT CHANGE UP (ref 1.2–3.4)
LYMPHOCYTES # BLD AUTO: 49.7 % — SIGNIFICANT CHANGE UP (ref 20.5–51.1)
MCHC RBC-ENTMCNC: 30.2 PG — SIGNIFICANT CHANGE UP (ref 27–31)
MCHC RBC-ENTMCNC: 33.3 G/DL — SIGNIFICANT CHANGE UP (ref 32–37)
MCV RBC AUTO: 90.5 FL — SIGNIFICANT CHANGE UP (ref 81–99)
MONOCYTES # BLD AUTO: 0.33 K/UL — SIGNIFICANT CHANGE UP (ref 0.1–0.6)
MONOCYTES NFR BLD AUTO: 7.6 % — SIGNIFICANT CHANGE UP (ref 1.7–9.3)
NEUTROPHILS # BLD AUTO: 1.63 K/UL — SIGNIFICANT CHANGE UP (ref 1.4–6.5)
NEUTROPHILS NFR BLD AUTO: 37.5 % — LOW (ref 42.2–75.2)
NRBC # BLD: 0 /100 WBCS — SIGNIFICANT CHANGE UP (ref 0–0)
PLATELET # BLD AUTO: 185 K/UL — SIGNIFICANT CHANGE UP (ref 130–400)
POTASSIUM SERPL-MCNC: 4 MMOL/L — SIGNIFICANT CHANGE UP (ref 3.5–5)
POTASSIUM SERPL-SCNC: 4 MMOL/L — SIGNIFICANT CHANGE UP (ref 3.5–5)
PROT SERPL-MCNC: 5.9 G/DL — LOW (ref 6–8)
RBC # BLD: 3.91 M/UL — LOW (ref 4.2–5.4)
RBC # FLD: 12.4 % — SIGNIFICANT CHANGE UP (ref 11.5–14.5)
SALICYLATES SERPL-MCNC: <0.3 MG/DL — LOW (ref 4–30)
SODIUM SERPL-SCNC: 142 MMOL/L — SIGNIFICANT CHANGE UP (ref 135–146)
WBC # BLD: 4.35 K/UL — LOW (ref 4.8–10.8)
WBC # FLD AUTO: 4.35 K/UL — LOW (ref 4.8–10.8)

## 2020-11-12 PROCEDURE — 99285 EMERGENCY DEPT VISIT HI MDM: CPT

## 2020-11-12 PROCEDURE — 70450 CT HEAD/BRAIN W/O DYE: CPT | Mod: 26

## 2020-11-12 PROCEDURE — 71045 X-RAY EXAM CHEST 1 VIEW: CPT | Mod: 26

## 2020-11-12 PROCEDURE — 72125 CT NECK SPINE W/O DYE: CPT | Mod: 26

## 2020-11-12 NOTE — ED PROVIDER NOTE - PATIENT PORTAL LINK FT
You can access the FollowMyHealth Patient Portal offered by [facility] by registering at the following website: [email or web address]. By joining [facility]’s FollowMyHealth portal, you will also be able to view your health information using other applications (apps) compatible with our system.

## 2020-11-12 NOTE — ED PROVIDER NOTE - PROGRESS NOTE DETAILS
Kenzie: patient's dad came to bedside to provide additional history.    Patient has a long history of substance abuse, initially began with oral opiates around age 15, has been in and out of rehab since then with long periods of sobriety. On last rehab stay she was introduced to meth and since then her behavior has become more erratic, having delusions, leaving home for periods of time. Today, after being gone for 4 days she called her dad and he went and picked her up from [place]. At home she seemed agitated, high, prompting her sister to call EMS.   He is unsure what substances she is currently on. Despite this, the patient has not endorsed any SI or HI to me or staff, she is resting comfortably. Will check labs and monitor for sobriety for reassessment. However currently does not meet criteria to hold against her will. Kenzie: aylin is awake, alert, sober -- she denies SI or HI, she adamantly refuses to speak to psych, refuses to allow me to call her father and explain. Will dc home

## 2020-11-12 NOTE — ED ADULT NURSE NOTE - NSFALLRSKASSESASSIST_ED_ALL_ED
back pain, joint swelling and neck pain. Skin: Negative for pallor and rash. Neurological: Negative for dizziness, syncope, weakness, light-headedness, numbness and headaches. Hematological: Negative for adenopathy. Psychiatric/Behavioral: Negative for confusion and suicidal ideas. The patient is not nervous/anxious. PAST MEDICAL HISTORY    has a past medical history of Anxiety, Anxiety, Bipolar affective ([address] COPD (chronic obstructive pulmonary disease) ([address] Depression, Hx of release of tendon, and Tendinitis. SURGICAL HISTORY      has a past surgical history that includes Tubal ligation; Finger trigger release; Finger surgery; Dilation and curettage of uterus; and Endometrial ablation. CURRENT MEDICATIONS       Discharge Medication List as of 9/22/2019 12:13 PM      CONTINUE these medications which have NOT CHANGED    Details   hydrOXYzine (VISTARIL) 25 MG capsule Take 50 mg by mouth 3 times daily as needed for ItchingHistorical Med      Umeclidinium Bromide (INCRUSE ELLIPTA) 62.5 MCG/INH AEPB inhale 1 puff by mouth daily, Disp-1 each, R-5Normal      albuterol sulfate  (90 Base) MCG/ACT inhaler inhale 2 puffs by mouth every 6 hours if needed for wheezing, Disp-3 Inhaler, R-2Normal      carBAMazepine (TEGRETOL) 200 MG tablet Take 1 tablet by mouth 2 times daily, Disp-60 tablet, R-0Normal      clonazePAM (KLONOPIN) 0.5 MG tablet Take 1 tablet by mouth 2 times daily as needed for Anxiety for up to 15 days. , Disp-30 tablet, R-0Print      sertraline (ZOLOFT) 50 MG tablet Take 1 tablet by mouth daily, Disp-30 tablet, R-0Normal      traZODone (DESYREL) 50 MG tablet Take 1 tablet by mouth nightly as needed for Sleep, Disp-30 tablet, R-0Normal      meloxicam (MOBIC) 15 MG tablet take 1 tablet by mouth once daily, Disp-30 tablet, R-5Normal             ALLERGIES   is allergic to codeine; tramadol; and tylenol with codeine #3 [acetaminophen-codeine].     FAMILY HISTORY     She indicated that her
no
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
## 2020-11-12 NOTE — ED PROVIDER NOTE - NSFOLLOWUPINSTRUCTIONS_ED_ALL_ED_FT
Substance Use Disorder    Substance use disorder happens when a person's repeated use of drugs or alcohol interferes with his or her ability to be productive. This disorder can cause problems with your mental and physical health. It can affect your ability to have healthy relationships, and it can keep you from being able to meet your responsibilities at work, home, or school. It can also lead to addiction.    The most commonly abused substances include:    Alcohol.  Tobacco.  Marijuana.  Stimulants, such as cocaine and methamphetamine.  Hallucinogens, such as LSD and PCP.  Opioids, such as some prescription pain medicines and heroin.    What are the causes?  This condition may develop due to social, psychological, or physical reasons. Causes include:    Stress.  Abuse.  Peer pressure.  Anxiety.  Depression.    What increases the risk?  This condition is more likely to develop in people who:    Are stressed.  Have been abused.  Have a mental health disorder, such as depression.  Are born with certain genes.    What are the signs or symptoms?  Symptoms of this condition include:    Using the substance for longer periods of time or at a higher dosage than what is normal or intended.  Having a lasting desire to use the substance.  Being unable to slow down or stop your use of the substance.  Spending an abnormal amount of time seeking the substance, using the substance, or recovering from using the substance.  Craving the substance.  Substance use that:    Interferes with your work, school, or home life.  Interferes with your personal and social relationships.  Makes you give up activities that you once enjoyed or found important.    Using the substance even though:    You know it is dangerous or bad for your health.  You know it is causing problems in your life.    Needing more and more of the substance to get the same effect (developing tolerance).  Experiencing physical symptoms if you do not use the substance (withdrawal).  Using the substance to avoid withdrawal.    How is this diagnosed?  This condition may be diagnosed based on:    Your history of substance use.  The way in which substance abuse affects your life.  Having at least two symptoms of substance use disorder within a 12-month period.    Your health care provider may also test your blood and urine for alcohol and drugs.    How is this treated?  ImageThis condition may be treated by:    Stopping substance use safely. This may require taking medicines and being closely observed for several days.  Taking part in group and individual counseling from mental health providers who help people with substance use disorder.  Staying at a residential treatment center for several days or weeks.  Attending daily counseling sessions at a treatment center.  Taking medicine as told by your health care provider:    To ease symptoms and prevent complications during withdrawal.  To treat other mental health issues, such as depression or anxiety.  To block cravings by causing the same effects as the substance.  To block the effects of the substance or replace good sensations with unpleasant ones.    Going to a support group to share your experience with others who are going through the same thing. These groups are an important part of long-term recovery for many people. They include 12-step programs like Alcoholics Anonymous and Narcotics Anonymous.    Recovery can be a long process. Many people who undergo treatment start using the substance again after stopping. This is called a relapse. If you have a relapse, that does not mean that treatment will not work.    Follow these instructions at home:  Take over-the-counter and prescription medicines only as told by your health care provider.  Do not use any drugs or alcohol.  Keep all follow-up visits as told by your health care provider. This is important. This includes continuing to work with therapists, health care providers, and support groups.  Contact a health care provider if:  You cannot take your medicines as told.  Your symptoms get worse.  You have trouble resisting the urge to use drugs or alcohol.  Get help right away if:  You have serious thoughts about hurting yourself or someone else.  You have a relapse.  This information is not intended to replace advice given to you by your health care provider. Make sure you discuss any questions you have with your health care provider.    Please check this website for help finding local Buprenorphine (Suboxone) providers or to find out if your PMD can prescribe Buprenorphine (Suboxone).  https://[email or web address]/medication-assisted-treatment/practitioner-program-data/treatment-practitioner-

## 2020-11-12 NOTE — ED PROVIDER NOTE - NSFOLLOWUPCLINICS_GEN_ALL_ED_FT
[facility]  Detox Mgmt  [address] [place] [address]  Phone: [phone]  Fax:   Follow Up Time:

## 2020-11-12 NOTE — ED PROVIDER NOTE - NS ED ROS FT
Constitutional: see HPI  Cardiac: No chest pain, SOB or edema.  Respiratory: No cough or respiratory distress  GI: No nausea, vomiting, diarrhea or abdominal pain.  : No dysuria, frequency, urgency or hematuria  MS: see HPI  Neuro: see HPI  Skin: No skin rash.  Except as documented in the HPI, all other systems are negative.

## 2020-11-12 NOTE — ED ADULT TRIAGE NOTE - STATUS:
Bedside nurse requested IV antinausea medications as the patient is complaining of N/V probably 2/2 to withdrawal.  Last QTc: 417    Order of IV zofran placed     Roosevelt Alejandro MD  [facility].  Pager: [phone].     Intact

## 2020-11-12 NOTE — ED PROVIDER NOTE - PHYSICAL EXAMINATION
VITAL SIGNS: I have reviewed nursing notes and confirm.  CONSTITUTIONAL: Well-developed; well-nourished; in no acute distress.  SKIN: Skin exam is warm and dry, no acute rash.  HEAD: Normocephalic; atraumatic, no hematoma, contusion, abrasion  EYES: PERRL, EOM intact; conjunctiva and sclera clear, has mild extinguishing nystagmus.  ENT: No nasal discharge; airway clear  NECK: Supple; non tender.  CARD: RRR, no murmur  RESP: No wheezes, rales or rhonchi.  ABD: Normal bowel sounds; soft; non-distended; non-tender  EXT: Normal ROM.  NEURO: Alert, oriented. moving limbs, slightly slurred speech  PSYCH: Cooperative, appropriate.

## 2020-11-12 NOTE — ED PROVIDER NOTE - CLINICAL SUMMARY MEDICAL DECISION MAKING FREE TEXT BOX
Patient presented altered presumably from drugs sp fall -- negative head and C spine, now sober, speaking clearly, refused psych assessment. Will dc.
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
## 2020-11-13 VITALS
DIASTOLIC BLOOD PRESSURE: 72 MMHG | OXYGEN SATURATION: 100 % | HEART RATE: 73 BPM | SYSTOLIC BLOOD PRESSURE: 108 MMHG | TEMPERATURE: 97 F | RESPIRATION RATE: 18 BRPM

## 2020-11-13 LAB
AMPHET UR-MCNC: POSITIVE
BARBITURATES UR SCN-MCNC: NEGATIVE — SIGNIFICANT CHANGE UP
BENZODIAZ UR-MCNC: POSITIVE
COCAINE METAB.OTHER UR-MCNC: NEGATIVE — SIGNIFICANT CHANGE UP
DRUG SCREEN 1, URINE RESULT: SIGNIFICANT CHANGE UP
METHADONE UR-MCNC: NEGATIVE — SIGNIFICANT CHANGE UP
OPIATES UR-MCNC: POSITIVE
PCP UR-MCNC: NEGATIVE — SIGNIFICANT CHANGE UP
PROPOXYPHENE QUALITATIVE URINE RESULT: NEGATIVE — SIGNIFICANT CHANGE UP
THC UR QL: NEGATIVE — SIGNIFICANT CHANGE UP

## 2020-11-17 LAB
6-ACETYLMORPHINE, UR RESULT: 1076 NG/ML — SIGNIFICANT CHANGE UP
6MAM UR CFM-MCNC: 1076 NG/ML — SIGNIFICANT CHANGE UP
CODEINE UR CFM-MCNC: 543 NG/ML — SIGNIFICANT CHANGE UP
CODEINE, UR RESULT: 543 NG/ML — SIGNIFICANT CHANGE UP
HYDROCODONE UR QL CFM: NEGATIVE NG/ML — SIGNIFICANT CHANGE UP
HYDROCODONE, UR RESULT: NEGATIVE NG/ML — SIGNIFICANT CHANGE UP
HYDROMORPHONE UR QL CFM: NEGATIVE NG/ML — SIGNIFICANT CHANGE UP
HYDROMORPHONE, UR RESULT: NEGATIVE NG/ML — SIGNIFICANT CHANGE UP
MORPHINE UR QL CFM: SIGNIFICANT CHANGE UP NG/ML
MORPHINE, UR RESULT: SIGNIFICANT CHANGE UP NG/ML
NOROXYCODONE (OPIATES), UR RESULT: NEGATIVE NG/ML — SIGNIFICANT CHANGE UP
NOROXYCODONE UR CFM-MCNC: NEGATIVE NG/ML — SIGNIFICANT CHANGE UP
OPIATES IN-HOUSE INTERPRETATION: POSITIVE
OPIATES UR QL CFM: POSITIVE
OXYCODONE (OPIATES), UR RESULT: NEGATIVE NG/ML — SIGNIFICANT CHANGE UP
OXYCODONE UR-MCNC: NEGATIVE NG/ML — SIGNIFICANT CHANGE UP
OXYMORPHONE (OPIATES), UR RESULT: NEGATIVE NG/ML — SIGNIFICANT CHANGE UP
OXYMORPHONE UR CFM-MCNC: NEGATIVE NG/ML — SIGNIFICANT CHANGE UP

## 2020-11-19 LAB
AMPHET UR QL SCN: POSITIVE
AMPHET UR QL SCN: SIGNIFICANT CHANGE UP NG/ML
AMPHETAMINE IN-HOUSE INTERPRETATION: POSITIVE
AMPHETAMINE, UR RESULT: SIGNIFICANT CHANGE UP NG/ML
MDA UR QL SCN: NEGATIVE NG/ML — SIGNIFICANT CHANGE UP
MDA, UR RESULT: NEGATIVE NG/ML — SIGNIFICANT CHANGE UP
MDEA, UR RESULT: NEGATIVE NG/ML — SIGNIFICANT CHANGE UP
MDMA UR QL SCN: NEGATIVE NG/ML — SIGNIFICANT CHANGE UP
MDMA, UR RESULT: NEGATIVE NG/ML — SIGNIFICANT CHANGE UP
METHAMPHET UR QL SCN: SIGNIFICANT CHANGE UP NG/ML
METHAMPHETAMINE, UR RESULT: SIGNIFICANT CHANGE UP NG/ML

## 2021-02-22 NOTE — ED PROCEDURE NOTE - CPROC ED POST PROC CARE GUIDE1
Ambulatory Instructed patient/caregiver to follow-up with primary care physician./Verbal/written post procedure instructions were given to patient/caregiver./Instructed patient/caregiver regarding signs and symptoms of infection./Keep the cast/splint/dressing clean and dry.

## 2021-02-26 ENCOUNTER — EMERGENCY (EMERGENCY)
Facility: HOSPITAL | Age: 31
LOS: 0 days | Discharge: HOME | End: 2021-02-26
Attending: EMERGENCY MEDICINE | Admitting: EMERGENCY MEDICINE
Payer: MEDICAID

## 2021-02-26 VITALS
WEIGHT: 136.91 LBS | SYSTOLIC BLOOD PRESSURE: 125 MMHG | HEART RATE: 114 BPM | HEIGHT: 64 IN | OXYGEN SATURATION: 100 % | DIASTOLIC BLOOD PRESSURE: 81 MMHG | RESPIRATION RATE: 20 BRPM | TEMPERATURE: 97 F
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
## 2021-02-26 DIAGNOSIS — S46.811A STRAIN OF OTHER MUSCLES, FASCIA AND TENDONS AT SHOULDER AND UPPER ARM LEVEL, RIGHT ARM, INITIAL ENCOUNTER: ICD-10-CM

## 2021-02-26 DIAGNOSIS — S06.0X1A CONCUSSION WITH LOSS OF CONSCIOUSNESS OF 30 MINUTES OR LESS, INITIAL ENCOUNTER: ICD-10-CM
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
## 2021-02-26 DIAGNOSIS — M54.2 CERVICALGIA: ICD-10-CM

## 2021-02-26 DIAGNOSIS — F17.200 NICOTINE DEPENDENCE, UNSPECIFIED, UNCOMPLICATED: ICD-10-CM

## 2021-02-26 PROCEDURE — 70450 CT HEAD/BRAIN W/O DYE: CPT | Mod: 26

## 2021-02-26 PROCEDURE — 99284 EMERGENCY DEPT VISIT MOD MDM: CPT

## 2021-02-26 RX ORDER — ONDANSETRON 8 MG/1
4 TABLET, FILM COATED ORAL ONCE
Refills: 0 | Status: COMPLETED | OUTPATIENT
Start: 2021-02-26 | End: 2021-02-26

## 2021-02-26 RX ORDER — ACETAMINOPHEN 500 MG
975 TABLET ORAL ONCE
Refills: 0 | Status: COMPLETED | OUTPATIENT
Start: 2021-02-26 | End: 2021-02-26

## 2021-02-26 RX ADMIN — Medication 975 MILLIGRAM(S): at 19:54

## 2021-02-26 RX ADMIN — ONDANSETRON 4 MILLIGRAM(S): 8 TABLET, FILM COATED ORAL at 19:54

## 2021-02-26 NOTE — ED PROVIDER NOTE - NSFOLLOWUPINSTRUCTIONS_ED_ALL_ED_FT
Concussion    WHAT YOU NEED TO KNOW:    A concussion is a mild brain injury. It is usually caused by a bump or blow to the head from a fall, a motor vehicle crash, or a sports injury. Sometimes being shaken forcefully may cause a concussion.    DISCHARGE INSTRUCTIONS:    Have someone call 911 for any of the following:   •You cannot be woken.      •You have a seizure, increasing confusion, or a change in personality.      •Your speech becomes slurred, or you have new vision problems.      Seek care immediately if:   •You have sudden and new vision problems.      •You have a severe headache that does not go away.      •You have arm or leg weakness, numbness, or new problems with coordination.      •You have blood or clear fluid coming out of the ears or nose.      Contact your healthcare provider if:   •You have nausea or are vomiting.      •You feel more sleepy than usual.      •Your symptoms get worse.      •Your symptoms last longer than 6 weeks after the injury.      •You have questions or concerns about your condition or care.      Medicines: You may need any of the following:   •Acetaminophen decreases pain and fever. It is available without a doctor's order. Ask how much to take and how often to take it. Follow directions. Read the labels of all other medicines you are using to see if they also contain acetaminophen, or ask your doctor or pharmacist. Acetaminophen can cause liver damage if not taken correctly. Do not use more than 4 grams (4,000 milligrams) total of acetaminophen in one day.       •NSAIDs help decrease swelling and pain or fever. This medicine is available with or without a doctor's order. NSAIDs can cause stomach bleeding or kidney problems in certain people. If you take blood thinner medicine, always ask your healthcare provider if NSAIDs are safe for you. Always read the medicine label and follow directions.      •Take your medicine as directed. Contact your healthcare provider if you think your medicine is not helping or if you have side effects. Tell him or her if you are allergic to any medicine. Keep a list of the medicines, vitamins, and herbs you take. Include the amounts, and when and why you take them. Bring the list or the pill bottles to follow-up visits. Carry your medicine list with you in case of an emergency.      Self-care: Concussion symptoms usually go away within about 10 days, but they may last longer. The following may be recommended to manage your symptoms:   •Rest from physical and mental activities as directed. Mental activities are those that require thinking, concentration, and attention. You will need to rest until your symptoms are gone. Rest will allow you to recover from your concussion. Ask your healthcare provider when you can return to work and other daily activities.      •Have someone stay with you for the first 24 hours after your injury. Your healthcare provider should be contacted if your symptoms get worse, or you develop new symptoms.      •Do not participate in sports and physical activities until your healthcare provider says it is okay. They could make your symptoms worse or lead to another concussion. Your healthcare provider will tell you when it is okay for you to return to sports or physical activities. Ask for more information about sports concussions.      Prevent another concussion:   •Wear protective sports equipment that fits properly. Helmets help decrease your risk for a serious brain injury. Talk to your healthcare provider about ways that can decrease your risk for a concussion if you play sports.      •Wear your seatbelt every time you travel. This helps to decrease your risk for a head injury if you are in a car accident.       Follow up with your healthcare provider as directed: Write down your questions so you remember to ask them during your visits.

## 2021-02-26 NOTE — ED PROVIDER NOTE - PHYSICAL EXAMINATION
Vital Signs: I have reviewed the initial vital signs.  Constitutional: well-nourished, appears stated age, no acute distress, GCS 15  HEENT: NC/AT, PERRLA, EOMI, conjunctivae pink, sclerae anicteric, mmm, oropharynx clear, TMs normal  Neck: supple,  no midline ttp, right trapezius ttp and spasm  Cardiovascular: RRR no m/r/g  Respiratory: CTA no w/r/r  Gastrointestinal: +bs, snt/nd  Musculoskeletal: FROM x 4 no c/c/e, pulses equal calves nontender  Integumentary: warm, dry, no rash  Neurologic: awake, alert, cranial nerves II-XII grossly intact, extremities’ motor and sensory functions grossly intact, gait steady  Psychiatric: appropriate mood, appropriate affect

## 2021-02-26 NOTE — ED PROVIDER NOTE - NSFOLLOWUPCLINICS_GEN_ALL_ED_FT
[facility] Concussion Program  Concussion Program  [address] [place] [address]   Phone: [phone]  Fax:   Follow Up Time:

## 2021-02-26 NOTE — ED PROVIDER NOTE - NS ED ROS FT
Constitutional: (-) fever  Eyes/ENT: (+) blurry vision, (-) epistaxis  Cardiovascular: (-) chest pain, (-) syncope  Respiratory: (-) cough, (-) shortness of breath  Gastrointestinal: (-) vomiting, (-) diarrhea  Musculoskeletal: (+) neck pain, (-) back pain, (-) joint pain  Integumentary: (-) rash, (-) edema  Neurological: see HPI  Psychiatric: (-) hallucinations  Allergic/Immunologic: (-) pruritus
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
## 2021-02-26 NOTE — ED PROVIDER NOTE - PATIENT PORTAL LINK FT
You can access the FollowMyHealth Patient Portal offered by [facility] by registering at the following website: [email or web address]. By joining [employer]’s FollowMyHealth portal, you will also be able to view your health information using other applications (apps) compatible with our system.

## 2021-03-01 ENCOUNTER — OUTPATIENT (OUTPATIENT)
Dept: OUTPATIENT SERVICES | Facility: HOSPITAL | Age: 31
LOS: 1 days | End: 2021-03-01
Payer: MEDICAID

## 2021-03-04 DIAGNOSIS — Z71.89 OTHER SPECIFIED COUNSELING: ICD-10-CM

## 2021-04-08 ENCOUNTER — EMERGENCY (EMERGENCY)
Facility: HOSPITAL | Age: 31
LOS: 0 days | Discharge: HOME | End: 2021-04-08
Attending: STUDENT IN AN ORGANIZED HEALTH CARE EDUCATION/TRAINING PROGRAM | Admitting: STUDENT IN AN ORGANIZED HEALTH CARE EDUCATION/TRAINING PROGRAM
Payer: MEDICAID

## 2021-04-08 VITALS
RESPIRATION RATE: 20 BRPM | HEIGHT: 64 IN | SYSTOLIC BLOOD PRESSURE: 123 MMHG | WEIGHT: 145.06 LBS | OXYGEN SATURATION: 99 % | HEART RATE: 64 BPM | DIASTOLIC BLOOD PRESSURE: 76 MMHG | TEMPERATURE: 99 F

## 2021-04-08 VITALS
RESPIRATION RATE: 16 BRPM | SYSTOLIC BLOOD PRESSURE: 133 MMHG | OXYGEN SATURATION: 98 % | HEART RATE: 66 BPM | DIASTOLIC BLOOD PRESSURE: 80 MMHG
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
## 2021-04-08 DIAGNOSIS — H57.11 OCULAR PAIN, RIGHT EYE: ICD-10-CM

## 2021-04-08 DIAGNOSIS — H05.231 HEMORRHAGE OF RIGHT ORBIT: ICD-10-CM
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
## 2021-04-08 DIAGNOSIS — S02.31XA FRACTURE OF ORBITAL FLOOR, RIGHT SIDE, INITIAL ENCOUNTER FOR CLOSED FRACTURE: ICD-10-CM

## 2021-04-08 DIAGNOSIS — Z79.2 LONG TERM (CURRENT) USE OF ANTIBIOTICS: ICD-10-CM

## 2021-04-08 DIAGNOSIS — Z20.822 CONTACT WITH AND (SUSPECTED) EXPOSURE TO COVID-19: ICD-10-CM

## 2021-04-08 LAB
ALBUMIN SERPL ELPH-MCNC: 4 G/DL — SIGNIFICANT CHANGE UP (ref 3.5–5.2)
ALP SERPL-CCNC: 67 U/L — SIGNIFICANT CHANGE UP (ref 30–115)
ALT FLD-CCNC: 10 U/L — SIGNIFICANT CHANGE UP (ref 0–41)
ANION GAP SERPL CALC-SCNC: 8 MMOL/L — SIGNIFICANT CHANGE UP (ref 7–14)
APAP SERPL-MCNC: <5 UG/ML — LOW (ref 10–30)
AST SERPL-CCNC: 15 U/L — SIGNIFICANT CHANGE UP (ref 0–41)
BASOPHILS # BLD AUTO: 0.05 K/UL — SIGNIFICANT CHANGE UP (ref 0–0.2)
BASOPHILS NFR BLD AUTO: 1 % — SIGNIFICANT CHANGE UP (ref 0–1)
BILIRUB SERPL-MCNC: 0.4 MG/DL — SIGNIFICANT CHANGE UP (ref 0.2–1.2)
BUN SERPL-MCNC: 8 MG/DL — LOW (ref 10–20)
CALCIUM SERPL-MCNC: 8.8 MG/DL — SIGNIFICANT CHANGE UP (ref 8.5–10.1)
CHLORIDE SERPL-SCNC: 105 MMOL/L — SIGNIFICANT CHANGE UP (ref 98–110)
CO2 SERPL-SCNC: 26 MMOL/L — SIGNIFICANT CHANGE UP (ref 17–32)
CREAT SERPL-MCNC: 0.6 MG/DL — LOW (ref 0.7–1.5)
EOSINOPHIL # BLD AUTO: 0.34 K/UL — SIGNIFICANT CHANGE UP (ref 0–0.7)
EOSINOPHIL NFR BLD AUTO: 6.9 % — SIGNIFICANT CHANGE UP (ref 0–8)
ETHANOL SERPL-MCNC: <10 MG/DL — SIGNIFICANT CHANGE UP
GLUCOSE SERPL-MCNC: 95 MG/DL — SIGNIFICANT CHANGE UP (ref 70–99)
HCT VFR BLD CALC: 39.8 % — SIGNIFICANT CHANGE UP (ref 37–47)
HGB BLD-MCNC: 13.2 G/DL — SIGNIFICANT CHANGE UP (ref 12–16)
IMM GRANULOCYTES NFR BLD AUTO: 0.4 % — HIGH (ref 0.1–0.3)
LYMPHOCYTES # BLD AUTO: 1.72 K/UL — SIGNIFICANT CHANGE UP (ref 1.2–3.4)
LYMPHOCYTES # BLD AUTO: 34.8 % — SIGNIFICANT CHANGE UP (ref 20.5–51.1)
MCHC RBC-ENTMCNC: 29.7 PG — SIGNIFICANT CHANGE UP (ref 27–31)
MCHC RBC-ENTMCNC: 33.2 G/DL — SIGNIFICANT CHANGE UP (ref 32–37)
MCV RBC AUTO: 89.6 FL — SIGNIFICANT CHANGE UP (ref 81–99)
MONOCYTES # BLD AUTO: 0.37 K/UL — SIGNIFICANT CHANGE UP (ref 0.1–0.6)
MONOCYTES NFR BLD AUTO: 7.5 % — SIGNIFICANT CHANGE UP (ref 1.7–9.3)
NEUTROPHILS # BLD AUTO: 2.44 K/UL — SIGNIFICANT CHANGE UP (ref 1.4–6.5)
NEUTROPHILS NFR BLD AUTO: 49.4 % — SIGNIFICANT CHANGE UP (ref 42.2–75.2)
NRBC # BLD: 0 /100 WBCS — SIGNIFICANT CHANGE UP (ref 0–0)
PLATELET # BLD AUTO: 183 K/UL — SIGNIFICANT CHANGE UP (ref 130–400)
POTASSIUM SERPL-MCNC: 4.3 MMOL/L — SIGNIFICANT CHANGE UP (ref 3.5–5)
POTASSIUM SERPL-SCNC: 4.3 MMOL/L — SIGNIFICANT CHANGE UP (ref 3.5–5)
PROT SERPL-MCNC: 6.3 G/DL — SIGNIFICANT CHANGE UP (ref 6–8)
RBC # BLD: 4.44 M/UL — SIGNIFICANT CHANGE UP (ref 4.2–5.4)
RBC # FLD: 12.4 % — SIGNIFICANT CHANGE UP (ref 11.5–14.5)
SALICYLATES SERPL-MCNC: <0.3 MG/DL — LOW (ref 4–30)
SARS-COV-2 RNA SPEC QL NAA+PROBE: SIGNIFICANT CHANGE UP
SODIUM SERPL-SCNC: 139 MMOL/L — SIGNIFICANT CHANGE UP (ref 135–146)
WBC # BLD: 4.94 K/UL — SIGNIFICANT CHANGE UP (ref 4.8–10.8)
WBC # FLD AUTO: 4.94 K/UL — SIGNIFICANT CHANGE UP (ref 4.8–10.8)

## 2021-04-08 PROCEDURE — 70486 CT MAXILLOFACIAL W/O DYE: CPT | Mod: 26

## 2021-04-08 PROCEDURE — 72125 CT NECK SPINE W/O DYE: CPT | Mod: 26

## 2021-04-08 PROCEDURE — 99285 EMERGENCY DEPT VISIT HI MDM: CPT

## 2021-04-08 PROCEDURE — 92002 INTRM OPH EXAM NEW PATIENT: CPT

## 2021-04-08 PROCEDURE — 70450 CT HEAD/BRAIN W/O DYE: CPT | Mod: 26

## 2021-04-08 RX ORDER — SODIUM CHLORIDE 9 MG/ML
1000 INJECTION INTRAMUSCULAR; INTRAVENOUS; SUBCUTANEOUS ONCE
Refills: 0 | Status: COMPLETED | OUTPATIENT
Start: 2021-04-08 | End: 2021-04-08

## 2021-04-08 RX ADMIN — SODIUM CHLORIDE 2000 MILLILITER(S): 9 INJECTION INTRAMUSCULAR; INTRAVENOUS; SUBCUTANEOUS at 07:32

## 2021-04-08 NOTE — ED PROVIDER NOTE - NSFOLLOWUPCLINICS_GEN_ALL_ED_FT
[facility]hthalmolgy [facility]  Ophthalmolgy  [address], [address]  [place] [address]  Phone: [phone]  Fax:   Scheduled Appointment: 4/9/2021      [facility]  Ophthalmolgy  [address], [address]  [place] [address]  Phone: [phone]  Fax:   Scheduled Appointment: 4/9/2021

## 2021-04-08 NOTE — ED PROVIDER NOTE - PROGRESS NOTE DETAILS
Dw  opthalmology  Dr Petersen  for trace retrobulbar stranding/hematoma -  IOP 14    will recheck at  5hour  kiera from ed visit SR: patient arrived [place], examined, EOM in tacts. Will call optho & trauma consult Chriss- patient evaluated by optho EOM intact IOP 22 in right eye 19 in left eye.  ok to F/U at eye clinic outpatient.   mother requests to keep patient in ED for 1 hour in order for patients drug intervention  to come and take the patient to rehab. Chriss- trauma consulted. discussed patient.  Trauma does not deem consult necessary for isolated orbital fracture.
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
## 2021-04-08 NOTE — ED PROVIDER NOTE - PHYSICAL EXAMINATION
Physical Exam    Vital Signs: I have reviewed the initial vital signs.  Constitutional: well-nourished, appears stated age, no acute distress patient drowsy in emergency room.   Eyes:  right periorbital swelling and ecchymosis to upper eyelid,,  sclera clear, pupils equal reactive, no proptosis,  EOMI,  no hyphema,  no afferent pupillary defect.  OS 20/40, OD 20/40 ( pt wears glasses for distance - not with her - no contacts) no  raccoon eyes or postauricular ecchymosis.  Cardiovascular: S1 and S2, regular rate, regular rhythm, well-perfused extremities, radial pulses equal and 2+  Respiratory: unlabored respiratory effort, clear to auscultation bilaterally no wheezing, rales and rhonchi  Gastrointestinal: soft, non-tender abdomen, no pulsatile mass, normal bowl sounds  Musculoskeletal: supple neck, no lower extremity edema, no midline tenderness, + para cervical spasming.  Integumentary: warm, dry, no rash  Neurologic: awake, alert, cranial nerves II-XII grossly intact, extremities’ motor and sensory functions grossly intact  Psychiatric: appropriate mood, appropriate affect

## 2021-04-08 NOTE — ED PROVIDER NOTE - PATIENT PORTAL LINK FT
You can access the FollowMyHealth Patient Portal offered by [facility] by registering at the following website: [email or web address]. By joining [email or web address]’s FollowMyHealth portal, you will also be able to view your health information using other applications (apps) compatible with our system. You can access the FollowMyHealth Patient Portal offered by [facility] by registering at the following website: [email or web address]. By joining [employer]’s FollowMyHealth portal, you will also be able to view your health information using other applications (apps) compatible with our system.

## 2021-04-08 NOTE — CONSULT NOTE ADULT - ASSESSMENT
Principal Discharge DX:	Orbital floor fracture  Secondary Diagnosis:	Retrobulbar hematoma.    No signs of compression syndrome of the eye.   Patient should be seen as outpatient.   Patient should use artificial tears.      Principal Discharge DX:	Orbital floor fracture  Secondary Diagnosis:	Retrobulbar hematoma.    No signs of compartment syndrome of the eye.   Patient should be seen as outpatient.   Patient should use artificial tears.

## 2021-04-08 NOTE — ED ADULT NURSE REASSESSMENT NOTE - NS ED NURSE REASSESS COMMENT FT1
Pt. to be transferred to [facility] via EMS. Charge ASHLI Dyson and crit lead ASHLI abraham,.
Pt A+Ox4. Pt has bruising and swelling noted to the face. No complaints at this time.

## 2021-04-08 NOTE — ED PROVIDER NOTE - NS ED ROS FT
Constitutional: (-) fever  Eyes/ENT: (+) blurry vision, (-) epistaxis  Cardiovascular: (-) chest pain, (-) syncope  Respiratory: (-) cough, (-) shortness of breath  Gastrointestinal: (-) vomiting, (-) diarrhea  Musculoskeletal: (-) neck pain, (-) back pain, (-) joint pain  Integumentary: (-) rash, (-) edema  Neurological: (-) headache, (-) altered mental status  Psychiatric: (-) hallucinations  Allergic/Immunologic: (-) pruritus
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
## 2021-04-08 NOTE — ED PROVIDER NOTE - NSFOLLOWUPINSTRUCTIONS_ED_ALL_ED_FT
Orbital Floor Fracture, Blowout    The orbit, also called the eye socket, is a bony structure that protects the eye. The bottom wall of the orbit is called the orbital floor. It separates the orbit from a sinus. An orbital floor fracture is a break in the orbital floor. This type of fracture is called a "blowout" when tissues around the eye, including the muscle that is used to make the eye look down, becomes trapped within the fracture.     CAUSES  An orbital floor fracture is caused by a direct blow (blunt trauma) to the eye from the front.    SIGNS AND SYMPTOMS  A black eye.  Swelling and bruising around the eye.  A gurgling sound when pressure is placed on the eye area.   Seeing two of everything, with one object appearing higher than the other (vertical diplopia). The vertical diplopia is worse when looking up.  Pain around the eye when looking up.  One eye looks sunken compared to the other eye.  Numbness of the cheek and upper gum on the same side of the face as was injured.    DIAGNOSIS  A diagnosis is made with an eye exam. It is confirmed with X-rays or a CT scan of the eye.    TREATMENT  You may be prescribed medicines such as antibiotics, steroids, or decongestants. The fracture itself is usually not treated until all the swelling around the eye has gone away. This may take 1–2 weeks. After the swelling has gone away:    If your eye is not trapped within the fracture, you will not need treatment.  If you have persistent vertical double vision, your health care provider may try to free the muscle. If he or she cannot, you may need to have surgery.  If you have double vision, but only when looking up, your health care provider will discuss treatment options with you. Some people who do not spend a lot of time looking up choose not to have additional treatment. Others who need to look up often, such as electricians, need treatment.    HOME CARE INSTRUCTIONS  Keep all follow-up visits as directed by your health care provider. This is important.  Take medicines only as directed by your health care provider.   Follow your health care provider's instructions about:  Using ice packs or cold compresses to decrease swelling.  Sleeping with your head elevated.  Always follow recommendations about the wearing of protective glasses or goggles.  Do not wear contact lenses until your health care provider says it is okay.   Do not drive or perform your regular activities without your health care provider's approval. Be aware that if you are only using one eye to see, you may have difficulty with depth perception and the ability to  distance.  Do not blow your nose.   Stay away from marla areas.  Avoid traveling by plane or going to high-altitude areas. This may slow the healing of your swelling and increase sinus pain.    SEEK MEDICAL CARE IF:  Your vision changes.  The redness or swelling around the injured eye does not go away or becomes worse.  Blood or discolored discharge comes from your nose.  You have a fever.    SEEK IMMEDIATE MEDICAL CARE IF:  You have a sensation that you are seeing flashing lights.  You have sudden blindness.     MAKE SURE YOU:  Understand these instructions.  Will watch your condition.  Will get help right away if you are not doing well or get worse.    ADDITIONAL NOTES AND INSTRUCTIONS    Please follow up with your Primary MD in 24-48 hr.  Seek immediate medical care for any new/worsening signs or symptoms.
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
## 2021-06-03 ENCOUNTER — EMERGENCY (EMERGENCY)
Facility: HOSPITAL | Age: 31
LOS: 0 days | Discharge: HOME | End: 2021-06-03
Attending: EMERGENCY MEDICINE | Admitting: EMERGENCY MEDICINE
Payer: MEDICAID

## 2021-06-03 VITALS
HEART RATE: 77 BPM | OXYGEN SATURATION: 100 % | DIASTOLIC BLOOD PRESSURE: 86 MMHG | RESPIRATION RATE: 16 BRPM | SYSTOLIC BLOOD PRESSURE: 137 MMHG | TEMPERATURE: 98 F

## 2021-06-03 VITALS
SYSTOLIC BLOOD PRESSURE: 141 MMHG | HEIGHT: 64 IN | TEMPERATURE: 98 F | DIASTOLIC BLOOD PRESSURE: 77 MMHG | WEIGHT: 145.06 LBS | RESPIRATION RATE: 18 BRPM | OXYGEN SATURATION: 98 % | HEART RATE: 95 BPM

## 2021-06-03 DIAGNOSIS — R00.2 PALPITATIONS: ICD-10-CM

## 2021-06-03 DIAGNOSIS — F17.200 NICOTINE DEPENDENCE, UNSPECIFIED, UNCOMPLICATED: ICD-10-CM

## 2021-06-03 DIAGNOSIS — R07.89 OTHER CHEST PAIN: ICD-10-CM

## 2021-06-03 DIAGNOSIS — F14.90 COCAINE USE, UNSPECIFIED, UNCOMPLICATED: ICD-10-CM

## 2021-06-03 LAB
ALBUMIN SERPL ELPH-MCNC: 4.2 G/DL — SIGNIFICANT CHANGE UP (ref 3.5–5.2)
ALP SERPL-CCNC: 84 U/L — SIGNIFICANT CHANGE UP (ref 30–115)
ALT FLD-CCNC: 15 U/L — SIGNIFICANT CHANGE UP (ref 0–41)
ANION GAP SERPL CALC-SCNC: 8 MMOL/L — SIGNIFICANT CHANGE UP (ref 7–14)
AST SERPL-CCNC: 15 U/L — SIGNIFICANT CHANGE UP (ref 0–41)
BASOPHILS # BLD AUTO: 0.08 K/UL — SIGNIFICANT CHANGE UP (ref 0–0.2)
BASOPHILS NFR BLD AUTO: 1.4 % — HIGH (ref 0–1)
BILIRUB SERPL-MCNC: <0.2 MG/DL — SIGNIFICANT CHANGE UP (ref 0.2–1.2)
BUN SERPL-MCNC: 12 MG/DL — SIGNIFICANT CHANGE UP (ref 10–20)
CALCIUM SERPL-MCNC: 9.1 MG/DL — SIGNIFICANT CHANGE UP (ref 8.5–10.1)
CHLORIDE SERPL-SCNC: 99 MMOL/L — SIGNIFICANT CHANGE UP (ref 98–110)
CK SERPL-CCNC: 140 U/L — SIGNIFICANT CHANGE UP (ref 0–225)
CO2 SERPL-SCNC: 27 MMOL/L — SIGNIFICANT CHANGE UP (ref 17–32)
CREAT SERPL-MCNC: 0.6 MG/DL — LOW (ref 0.7–1.5)
EOSINOPHIL # BLD AUTO: 0.68 K/UL — SIGNIFICANT CHANGE UP (ref 0–0.7)
EOSINOPHIL NFR BLD AUTO: 11.7 % — HIGH (ref 0–8)
GLUCOSE SERPL-MCNC: 116 MG/DL — HIGH (ref 70–99)
HCT VFR BLD CALC: 39.5 % — SIGNIFICANT CHANGE UP (ref 37–47)
HGB BLD-MCNC: 12.9 G/DL — SIGNIFICANT CHANGE UP (ref 12–16)
IMM GRANULOCYTES NFR BLD AUTO: 0.2 % — SIGNIFICANT CHANGE UP (ref 0.1–0.3)
LYMPHOCYTES # BLD AUTO: 1.3 K/UL — SIGNIFICANT CHANGE UP (ref 1.2–3.4)
LYMPHOCYTES # BLD AUTO: 22.5 % — SIGNIFICANT CHANGE UP (ref 20.5–51.1)
MAGNESIUM SERPL-MCNC: 2 MG/DL — SIGNIFICANT CHANGE UP (ref 1.8–2.4)
MCHC RBC-ENTMCNC: 29.9 PG — SIGNIFICANT CHANGE UP (ref 27–31)
MCHC RBC-ENTMCNC: 32.7 G/DL — SIGNIFICANT CHANGE UP (ref 32–37)
MCV RBC AUTO: 91.6 FL — SIGNIFICANT CHANGE UP (ref 81–99)
MONOCYTES # BLD AUTO: 0.32 K/UL — SIGNIFICANT CHANGE UP (ref 0.1–0.6)
MONOCYTES NFR BLD AUTO: 5.5 % — SIGNIFICANT CHANGE UP (ref 1.7–9.3)
NEUTROPHILS # BLD AUTO: 3.4 K/UL — SIGNIFICANT CHANGE UP (ref 1.4–6.5)
NEUTROPHILS NFR BLD AUTO: 58.7 % — SIGNIFICANT CHANGE UP (ref 42.2–75.2)
NRBC # BLD: 0 /100 WBCS — SIGNIFICANT CHANGE UP (ref 0–0)
PLATELET # BLD AUTO: 219 K/UL — SIGNIFICANT CHANGE UP (ref 130–400)
POTASSIUM SERPL-MCNC: 4.3 MMOL/L — SIGNIFICANT CHANGE UP (ref 3.5–5)
POTASSIUM SERPL-SCNC: 4.3 MMOL/L — SIGNIFICANT CHANGE UP (ref 3.5–5)
PROT SERPL-MCNC: 6.6 G/DL — SIGNIFICANT CHANGE UP (ref 6–8)
RBC # BLD: 4.31 M/UL — SIGNIFICANT CHANGE UP (ref 4.2–5.4)
RBC # FLD: 13.1 % — SIGNIFICANT CHANGE UP (ref 11.5–14.5)
SODIUM SERPL-SCNC: 134 MMOL/L — LOW (ref 135–146)
TROPONIN T SERPL-MCNC: <0.01 NG/ML — SIGNIFICANT CHANGE UP
WBC # BLD: 5.79 K/UL — SIGNIFICANT CHANGE UP (ref 4.8–10.8)
WBC # FLD AUTO: 5.79 K/UL — SIGNIFICANT CHANGE UP (ref 4.8–10.8)

## 2021-06-03 PROCEDURE — 71045 X-RAY EXAM CHEST 1 VIEW: CPT | Mod: 26

## 2021-06-03 PROCEDURE — 93010 ELECTROCARDIOGRAM REPORT: CPT | Mod: NC

## 2021-06-03 PROCEDURE — 99285 EMERGENCY DEPT VISIT HI MDM: CPT

## 2021-06-03 RX ORDER — SODIUM CHLORIDE 9 MG/ML
1000 INJECTION INTRAMUSCULAR; INTRAVENOUS; SUBCUTANEOUS ONCE
Refills: 0 | Status: DISCONTINUED | OUTPATIENT
Start: 2021-06-03 | End: 2021-06-03

## 2021-06-03 NOTE — ED PROVIDER NOTE - CARE PLAN
Principal Discharge DX:	Palpitations  Secondary Diagnosis:	Chest pain  Secondary Diagnosis:	Cocaine use

## 2021-06-03 NOTE — ED PROVIDER NOTE - OBJECTIVE STATEMENT
31 year old female past medical history of substance abuse comes to emergency room for chest pain. patient states that she smoked heroin about 30min ago and started to get chest pain with anxiousness and came to emergency room. patient states that earlier smoked some heroin also. denies shortness of breath, no fever/chills.

## 2021-06-03 NOTE — ED PROVIDER NOTE - CLINICAL SUMMARY MEDICAL DECISION MAKING FREE TEXT BOX
31yF  pmhx  drug abuse pw palpitations  chest  "numbness" 3- minutes ago while doing cocaine - no headache  syncope back pain abdominal pain.    Alert well apperaing, CVS tachy regualr 2+ radial pulse b/l equal ,  RESP tachypnea   abd soft nontender   no le edema.  EKG  nsr no ischemia . normotensive,  BZD given .  labs and trop , cxr 31yF  pmhx  drug abuse pw palpitations  chest  "numbness" 30 minutes ago while smoking cocaine - no headache  syncope back pain abdominal pain.    Alert well appearing, CVS tachy regular 2+ radial pulse b/l equal ,  RESP tachypnea  no diaphoresis no tremor  abd soft nontender   no le edema.  EKG  nsr no ischemia . normotensive,  BZD given .  labs and trop , cxr 31yF  pmhx  drug abuse pw palpitations  chest  "numbness" 30 minutes ago while smoking cocaine - no headache  syncope back pain abdominal pain.    Alert well appearing, pupils 3mm bl  reactive CVS tachy regular 2+ radial pulse b/l equal ,  RESP tachypnea  no diaphoresis no tremor  abd soft nontender   no le edema.  EKG  nsr no ischemia . normotensive,  BZD given .  labs and trop , cxr 31yF  pmhx  drug abuse pw palpitations  chest  "numbness" 30 minutes ago while smoking cocaine - no headache  syncope back pain abdominal pain.    Alert well appearing, pupils 3mm bl  reactive CVS tachy regular 2+ radial pulse b/l equal ,  RESP tachypnea  no diaphoresis no tremor  abd soft nontender   no le edema.  EKG  nsr no ischemia . normotensive,  BZD given .  labs wnl and trop negative , cxr not wide   pt feels  much better -  Patient to be discharged from ED in well appearing condition. Any available test results were discussed with and printed  for patient.  Verbal instructions given, including instructions to return to ED immediately for any new, worsening, or concerning symptoms. Limitations of ED work up discussed.  Patient reports understanding of above with capacity and insight. Written discharge instructions additionally given, including follow-up plan.

## 2021-06-03 NOTE — ED PROVIDER NOTE - CARE PROVIDER_API CALL
Apolinar Meehan)  Cardiovascular Disease; Internal Medicine  [address] [place] [address]  Phone: [phone]  Fax: [phone]  Follow Up Time:

## 2021-06-03 NOTE — ED PROVIDER NOTE - NSFOLLOWUPCLINICS_GEN_ALL_ED_FT
[facility]  Detox Mgmt  [address] [place] [address]  Phone: [phone]  Fax:

## 2021-06-03 NOTE — ED PROVIDER NOTE - PATIENT PORTAL LINK FT
You can access the FollowMyHealth Patient Portal offered by [facility] by registering at the following website: [email or web address]. By joining [employer]’s FollowMyHealth portal, you will also be able to view your health information using other applications (apps) compatible with our system.

## 2021-06-03 NOTE — ED PROVIDER NOTE - NSFOLLOWUPINSTRUCTIONS_ED_ALL_ED_FT
Palpitations  ImageA palpitation is the feeling that your heartbeat is irregular or is faster than normal. It may feel like your heart is fluttering or skipping a beat. Palpitations are usually not a serious problem. They may be caused by many things, including smoking, caffeine, alcohol, stress, and certain medicines. Although most causes of palpitations are not serious, palpitations can be a sign of a serious medical problem. In some cases, you may need further medical evaluation.    Follow these instructions at home:  Pay attention to any changes in your symptoms. Take these actions to help with your condition:    Avoid the following:    Caffeinated coffee, tea, soft drinks, diet pills, and energy drinks.  Chocolate.  Alcohol.    Do not use any tobacco products, such as cigarettes, chewing tobacco, and e-cigarettes. If you need help quitting, ask your health care provider.  Try to reduce your stress and anxiety. Things that can help you relax include:    Yoga.  Meditation.  Physical activity, such as swimming, jogging, or walking.  Biofeedback. This is a method that helps you learn to use your mind to control things in your body, such as your heartbeats.    Get plenty of rest and sleep.  Take over-the-counter and prescription medicines only as told by your health care provider.  Keep all follow-up visits as told by your health care provider. This is important.    Contact a health care provider if:  You continue to have a fast or irregular heartbeat after 24 hours.  Your palpitations occur more often.  Get help right away if:  You have chest pain or shortness of breath.  You have a severe headache.  You feel dizzy or you faint.  This information is not intended to replace advice given to you by your health care provider. Make sure you discuss any questions you have with your health care provider.      Nonspecific Chest Pain  Chest pain can be caused by many different conditions. There is a chance that your pain could be related to something serious, such as a heart attack or a blood clot in your lungs. Chest pain can also be caused by conditions that are not life-threatening. If you have chest pain, it is very important to follow up with your doctor.    Follow these instructions at home:  Medicines     If you were prescribed an antibiotic medicine, take it as told by your doctor. Do not stop taking the antibiotic even if you start to feel better.  Take over-the-counter and prescription medicines only as told by your doctor.  Lifestyle     Do not use any products that contain nicotine or tobacco, such as cigarettes and e-cigarettes. If you need help quitting, ask your doctor.  Do not drink alcohol.  ImageMake lifestyle changes as told by your doctor. These may include:    Getting regular exercise. Ask your doctor for some activities that are safe for you.  Eating a heart-healthy diet. A diet specialist (dietitian) can help you to learn healthy eating options.  Staying at a healthy weight.  Managing diabetes, if needed.  Lowering your stress, as with deep breathing or spending time in nature.    General instructions     Avoid any activities that make you feel chest pain.  If your chest pain is because of heartburn:    Raise (elevate) the head of your bed about 6 inches (15 cm). You can do this by putting blocks under the bed legs at the head of the bed.  Do not sleep with extra pillows under your head. That does not help heartburn.    Keep all follow-up visits as told by your doctor. This is important. This includes any further testing if your chest pain does not go away.  Contact a doctor if:  Your chest pain does not go away.  You have a rash with blisters on your chest.  You have a fever.  You have chills.  Get help right away if:  Your chest pain is worse.  You have a cough that gets worse, or you cough up blood.  You have very bad (severe) pain in your belly (abdomen).  You are very weak.  You pass out (faint).  You have either of these for no clear reason:    Sudden chest discomfort.  Sudden discomfort in your arms, back, neck, or jaw.    You have shortness of breath at any time.  You suddenly start to sweat, or your skin gets clammy.  You feel sick to your stomach (nauseous).  You throw up (vomit).  You suddenly feel light-headed or dizzy.  Your heart starts to beat fast, or it feels like it is skipping beats.  These symptoms may be an emergency. Do not wait to see if the symptoms will go away. Get medical help right away. Call your local emergency services (911 in the [place] Do not drive yourself to the hospital.     This information is not intended to replace advice given to you by your health care provider. Make sure you discuss any questions you have with your health care provider.          COCAINE ABUSE - [facility](R) Instructions(ER/ED)           Cocaine Abuse    WHAT YOU NEED TO KNOW:    Cocaine abuse is a pattern of use that causes health or other problems. Abuse can include using large amounts of cocaine at one time or using it several times each day or week. You may start needing more cocaine to get the same feelings of happiness you got from lower amounts. You may have withdrawal symptoms if you have used cocaine for a long time and you suddenly use less or stop using it.     DISCHARGE INSTRUCTIONS:    Return to the emergency department if:   •You feel like hurting yourself or someone else.      •You have a seizure.      •You have a temperature over 101°F (38.3°C) after you use cocaine.      •You cough or spit up blood.      •You have severe abdominal pain.      •You have a severe headache, confusion, or feel very nervous.      •You have weakness on one side of your body.      •You have chest pain, sweating, or shortness of breath.      Contact your healthcare provider if:   •You feel you cannot cope with your problems.      •You have questions or concerns about your condition or care.      Signs of cocaine abuse: Cocaine abuse may lead to problems being around others, doing your job, or new medical problems. You may have the following problems:   •Use of more cocaine than you first wanted to use       •No ability to decrease or control your use of cocaine      •Spending much of your time using cocaine, or dealing with a hangover after you use cocaine      •Less time spent around others, at work, or doing activities that you enjoy      •Continued cocaine use, even when it causes physical or mental problems      How cocaine may affect your baby:   •Cocaine may harm your unborn baby's brain, heart, stomach, and bowels. It also increases your risk of a miscarriage, early delivery, or stillbirth. Cocaine can cause long-term medical problems for your baby.       •Your baby may go through withdrawal after he is born. He may have seizures, problems waking, or feeding. He may overreact to sounds or movement by violently jerking or jumping. He may vomit or have diarrhea. He may have learning difficulties or other behavior problems as he gets older.      Signs and symptoms of cocaine withdrawal: Withdrawal happens when you have used cocaine for a long period of time, and you suddenly take less or stop taking it. Symptoms may begin within a few hours after you decrease or stop taking cocaine and may include the following:   •Severe sadness or fatigue      •Restlessness, nervousness, or anxiety      •Nausea or vomiting      •Trouble sleeping or difficulty waking up      •Unpleasant dreams that seem real      •Seeing, hearing, or feeling things that are not really there      •Sweating, shaking, or a fast heartbeat      •Seizure      Follow up with your healthcare provider as directed: Write down your questions so you remember to ask them during your visits.     For support and more information:   •Substance Abuse and Mental Health Services Administration  [address]  [place] [address]  Web Address: http://www.samhsa.gov      •[employer] [place] [employer] Drug [employer]  [address]  [place], [id number]  Phone: 1-688.383.4705  Web Address: www.reilly.nih.gov

## 2021-07-01 ENCOUNTER — OUTPATIENT (OUTPATIENT)
Dept: OUTPATIENT SERVICES | Facility: HOSPITAL | Age: 31
LOS: 1 days | Discharge: HOME | End: 2021-07-01

## 2021-07-01 ENCOUNTER — OUTPATIENT (OUTPATIENT)
Dept: OUTPATIENT SERVICES | Facility: HOSPITAL | Age: 31
LOS: 1 days | Discharge: HOME | End: 2021-07-01
Payer: COMMERCIAL

## 2021-07-01 DIAGNOSIS — Z00.8 ENCOUNTER FOR OTHER GENERAL EXAMINATION: ICD-10-CM

## 2021-07-01 DIAGNOSIS — I49.9 CARDIAC ARRHYTHMIA, UNSPECIFIED: ICD-10-CM

## 2021-07-01 PROBLEM — F19.10 OTHER PSYCHOACTIVE SUBSTANCE ABUSE, UNCOMPLICATED: Chronic | Status: ACTIVE | Noted: 2021-06-03

## 2021-07-01 LAB
A1C WITH ESTIMATED AVERAGE GLUCOSE RESULT: 5.3 % — SIGNIFICANT CHANGE UP (ref 4–5.6)
ALBUMIN SERPL ELPH-MCNC: 4.5 G/DL — SIGNIFICANT CHANGE UP (ref 3.5–5.2)
ALP SERPL-CCNC: 91 U/L — SIGNIFICANT CHANGE UP (ref 30–115)
ALT FLD-CCNC: 20 U/L — SIGNIFICANT CHANGE UP (ref 0–41)
ANION GAP SERPL CALC-SCNC: 10 MMOL/L — SIGNIFICANT CHANGE UP (ref 7–14)
AST SERPL-CCNC: 19 U/L — SIGNIFICANT CHANGE UP (ref 0–41)
BILIRUB SERPL-MCNC: 0.2 MG/DL — SIGNIFICANT CHANGE UP (ref 0.2–1.2)
BUN SERPL-MCNC: 12 MG/DL — SIGNIFICANT CHANGE UP (ref 10–20)
CALCIUM SERPL-MCNC: 9.4 MG/DL — SIGNIFICANT CHANGE UP (ref 8.5–10.1)
CHLORIDE SERPL-SCNC: 104 MMOL/L — SIGNIFICANT CHANGE UP (ref 98–110)
CHOLEST SERPL-MCNC: 147 MG/DL — SIGNIFICANT CHANGE UP
CO2 SERPL-SCNC: 26 MMOL/L — SIGNIFICANT CHANGE UP (ref 17–32)
CREAT SERPL-MCNC: 0.7 MG/DL — SIGNIFICANT CHANGE UP (ref 0.7–1.5)
ESTIMATED AVERAGE GLUCOSE: 105 MG/DL — SIGNIFICANT CHANGE UP (ref 68–114)
GLUCOSE SERPL-MCNC: 104 MG/DL — HIGH (ref 70–99)
HCT VFR BLD CALC: 42.1 % — SIGNIFICANT CHANGE UP (ref 37–47)
HDLC SERPL-MCNC: 72 MG/DL — SIGNIFICANT CHANGE UP
HGB BLD-MCNC: 13.6 G/DL — SIGNIFICANT CHANGE UP (ref 12–16)
LIPID PNL WITH DIRECT LDL SERPL: 70 MG/DL — SIGNIFICANT CHANGE UP
MAGNESIUM SERPL-MCNC: 2.1 MG/DL — SIGNIFICANT CHANGE UP (ref 1.8–2.4)
MCHC RBC-ENTMCNC: 30.1 PG — SIGNIFICANT CHANGE UP (ref 27–31)
MCHC RBC-ENTMCNC: 32.3 G/DL — SIGNIFICANT CHANGE UP (ref 32–37)
MCV RBC AUTO: 93.1 FL — SIGNIFICANT CHANGE UP (ref 81–99)
NON HDL CHOLESTEROL: 75 MG/DL — SIGNIFICANT CHANGE UP
NRBC # BLD: 0 /100 WBCS — SIGNIFICANT CHANGE UP (ref 0–0)
PLATELET # BLD AUTO: 272 K/UL — SIGNIFICANT CHANGE UP (ref 130–400)
POTASSIUM SERPL-MCNC: 4.3 MMOL/L — SIGNIFICANT CHANGE UP (ref 3.5–5)
POTASSIUM SERPL-SCNC: 4.3 MMOL/L — SIGNIFICANT CHANGE UP (ref 3.5–5)
PROT SERPL-MCNC: 7.4 G/DL — SIGNIFICANT CHANGE UP (ref 6–8)
RBC # BLD: 4.52 M/UL — SIGNIFICANT CHANGE UP (ref 4.2–5.4)
RBC # FLD: 14.1 % — SIGNIFICANT CHANGE UP (ref 11.5–14.5)
SODIUM SERPL-SCNC: 140 MMOL/L — SIGNIFICANT CHANGE UP (ref 135–146)
TRIGL SERPL-MCNC: 47 MG/DL — SIGNIFICANT CHANGE UP
WBC # BLD: 7.22 K/UL — SIGNIFICANT CHANGE UP (ref 4.8–10.8)
WBC # FLD AUTO: 7.22 K/UL — SIGNIFICANT CHANGE UP (ref 4.8–10.8)

## 2021-07-01 PROCEDURE — G9005: CPT

## 2021-07-02 LAB
APPEARANCE UR: CLEAR — SIGNIFICANT CHANGE UP
BACTERIA # UR AUTO: ABNORMAL
BILIRUB UR-MCNC: NEGATIVE — SIGNIFICANT CHANGE UP
COLOR SPEC: YELLOW — SIGNIFICANT CHANGE UP
DIFF PNL FLD: NEGATIVE — SIGNIFICANT CHANGE UP
EPI CELLS # UR: ABNORMAL /HPF
GLUCOSE UR QL: NEGATIVE MG/DL — SIGNIFICANT CHANGE UP
HAV IGM SER-ACNC: SIGNIFICANT CHANGE UP
HBV CORE IGM SER-ACNC: SIGNIFICANT CHANGE UP
HBV SURFACE AG SER-ACNC: SIGNIFICANT CHANGE UP
HCG UR QL: NEGATIVE — SIGNIFICANT CHANGE UP
HCV AB S/CO SERPL IA: 0.27 S/CO — SIGNIFICANT CHANGE UP (ref 0–0.99)
HCV AB SERPL-IMP: SIGNIFICANT CHANGE UP
KETONES UR-MCNC: NEGATIVE — SIGNIFICANT CHANGE UP
LEUKOCYTE ESTERASE UR-ACNC: ABNORMAL
NITRITE UR-MCNC: NEGATIVE — SIGNIFICANT CHANGE UP
PH UR: 7 — SIGNIFICANT CHANGE UP (ref 5–8)
PROT UR-MCNC: NEGATIVE MG/DL — SIGNIFICANT CHANGE UP
RBC CASTS # UR COMP ASSIST: NEGATIVE — SIGNIFICANT CHANGE UP
SP GR SPEC: 1.01 — SIGNIFICANT CHANGE UP (ref 1.01–1.03)
T PALLIDUM AB TITR SER: NEGATIVE — SIGNIFICANT CHANGE UP
UROBILINOGEN FLD QL: 0.2 MG/DL — SIGNIFICANT CHANGE UP (ref 0.2–0.2)
WBC UR QL: ABNORMAL /HPF

## 2021-07-02 PROCEDURE — 93010 ELECTROCARDIOGRAM REPORT: CPT | Mod: NC

## 2021-07-03 LAB
GAMMA INTERFERON BACKGROUND BLD IA-ACNC: 0.01 IU/ML — SIGNIFICANT CHANGE UP
M TB IFN-G BLD-IMP: NEGATIVE — SIGNIFICANT CHANGE UP
M TB IFN-G CD4+ BCKGRND COR BLD-ACNC: 0 IU/ML — SIGNIFICANT CHANGE UP
M TB IFN-G CD4+CD8+ BCKGRND COR BLD-ACNC: 0 IU/ML — SIGNIFICANT CHANGE UP
QUANT TB PLUS MITOGEN MINUS NIL: 6.87 IU/ML — SIGNIFICANT CHANGE UP

## 2021-07-15 ENCOUNTER — EMERGENCY (EMERGENCY)
Facility: HOSPITAL | Age: 31
LOS: 0 days | Discharge: HOME | End: 2021-07-15
Attending: EMERGENCY MEDICINE | Admitting: EMERGENCY MEDICINE
Payer: MEDICAID

## 2021-07-15 VITALS
HEART RATE: 57 BPM | SYSTOLIC BLOOD PRESSURE: 101 MMHG | OXYGEN SATURATION: 97 % | DIASTOLIC BLOOD PRESSURE: 65 MMHG | RESPIRATION RATE: 20 BRPM

## 2021-07-15 VITALS
HEIGHT: 64 IN | DIASTOLIC BLOOD PRESSURE: 76 MMHG | RESPIRATION RATE: 20 BRPM | SYSTOLIC BLOOD PRESSURE: 116 MMHG | TEMPERATURE: 97 F | OXYGEN SATURATION: 97 % | HEART RATE: 86 BPM

## 2021-07-15 DIAGNOSIS — F17.200 NICOTINE DEPENDENCE, UNSPECIFIED, UNCOMPLICATED: ICD-10-CM

## 2021-07-15 DIAGNOSIS — F11.90 OPIOID USE, UNSPECIFIED, UNCOMPLICATED: ICD-10-CM

## 2021-07-15 DIAGNOSIS — F19.10 OTHER PSYCHOACTIVE SUBSTANCE ABUSE, UNCOMPLICATED: ICD-10-CM

## 2021-07-15 DIAGNOSIS — R41.82 ALTERED MENTAL STATUS, UNSPECIFIED: ICD-10-CM

## 2021-07-15 PROCEDURE — 93010 ELECTROCARDIOGRAM REPORT: CPT

## 2021-07-15 PROCEDURE — 99284 EMERGENCY DEPT VISIT MOD MDM: CPT

## 2021-07-15 PROCEDURE — 71045 X-RAY EXAM CHEST 1 VIEW: CPT | Mod: 26

## 2021-07-15 NOTE — ED ADULT NURSE REASSESSMENT NOTE - NS ED NURSE REASSESS COMMENT FT1
Spoke with Augustus at [facility]. [facility] unable to see patient because patient is under arrest, BARBI at bedside.

## 2021-07-15 NOTE — ED PROVIDER NOTE - NSFOLLOWUPCLINICS_GEN_ALL_ED_FT
[facility]  Detox Mgmt  [address] [place] [address]  Phone: [phone]  Fax:   Follow Up Time: Urgent

## 2021-07-15 NOTE — ED ADULT NURSE NOTE - NSIMPLEMENTINTERV_GEN_ALL_ED
Implemented All Fall with Harm Risk Interventions:  [place] to call system. Call bell, personal items and telephone within reach. Instruct patient to call for assistance. Room bathroom lighting operational. Non-slip footwear when patient is off stretcher. Physically safe environment: no spills, clutter or unnecessary equipment. Stretcher in lowest position, wheels locked, appropriate side rails in place. Provide visual cue, wrist band, yellow gown, etc. Monitor gait and stability. Monitor for mental status changes and reorient to person, place, and time. Review medications for side effects contributing to fall risk. Reinforce activity limits and safety measures with patient and family. Provide visual clues: red socks.

## 2021-07-15 NOTE — ED PROVIDER NOTE - ATTENDING CONTRIBUTION TO CARE
31 y.o. F, PMH of opioid use, [place]IB[place] and [employer] s/p [place] in abandoned backyard using heroin and was altered with pinpoint pupils. Narcan 2mg was given, MS improved.  Pt was arrested and is under custody for burglary.  Pt is verbally aggressive.  Pt admits to taking heroin and xanax.  Pt denies any other drug use, chest pain, SOB, abdominal pain, falls, trauma, n/v/d, headaches. On exam, pt is aggitated, AAOx3, head NC/AT, CN II-XII intact, lungs CTA B/L, CV S1S2 regular, abdomen soft/NT/ND/(+)BS, ext (-) edema, motor 5/5x4, sensation intact. Will treat pt for symptoms, observe and reevaluate.

## 2021-07-15 NOTE — ED PROVIDER NOTE - CARE PLAN
Principal Discharge DX:	Opioid use   Principal Discharge DX:	Opioid use  Secondary Diagnosis:	Substance abuse

## 2021-07-15 NOTE — ED PROVIDER NOTE - OBJECTIVE STATEMENT
30 yo F with PMH opioid use who was [place]IB[place] and [employer] s/p being found in abandoned backyard using heroin and AMS.  Per EMS, pinpoint pupils, 2mg narcan given, MS improved.  Pt was arrested under custody for burglary.  Pt was verbally aggressive.  Pt admits to taking heroin and xanax.  Pt denies any other drug use, chest pain, SOB, abdominal pain, falls, trauma, n/v/d, headaches.

## 2021-07-15 NOTE — ED PROVIDER NOTE - NSFOLLOWUPINSTRUCTIONS_ED_ALL_ED_FT
- Please follow up with your primary doctor and detox clinic      Opioid Withdrawal    Opioids are a group of narcotic drugs. They include the street drug heroin. They also include pain medicines, such as morphine, hydrocodone, oxycodone, and fentanyl. Opioid withdrawal is a group of characteristic physical and mental signs and symptoms. It typically occurs if you have been using opioids daily for several weeks or longer and stop using or rapidly decrease use. Opioid withdrawal can also occur if you have used opioids daily for a long time and are given a medicine to block the effect.     SIGNS AND SYMPTOMS  Opioid withdrawal includes three or more of the following symptoms:     Depressed, anxious, or irritable mood.   Nausea or vomiting.   Muscle aches or spasms.    Watery eyes.     Runny nose.  Dilated pupils, sweating, or hairs standing on end.   Diarrhea or intestinal cramping.   Yawning.    Fever.  Increased blood pressure.   Fast pulse.  Restlessness or trouble sleeping.    These signs and symptoms occur within several hours of stopping or reducing short-acting opioids, such as heroin. They can occur within 3 days of stopping or reducing long-acting opioids, such as methadone. Withdrawal begins within minutes of receiving a drug that blocks the effects of opioids, such as naltrexone or naloxone.    DIAGNOSIS  Opioid use disorder is diagnosed by your health care provider. You will be asked about your symptoms, drug and alcohol use, medical history, and use of medicines. A physical exam may be done. Lab tests may be ordered. Your health care provider may have you see a mental health professional.     TREATMENT  The treatment for opioid withdrawal is usually provided by medical doctors with special training in substance use disorders (addiction specialists). The following medicines may be included in treatment:     Opioids given in place of the abused opioid. They turn on opioid receptors in the brain and lessen or prevent withdrawal symptoms. They are gradually decreased (opioid substitution and taper).   Non-opioids that can lessen certain opioid withdrawal symptoms. They may be used alone or with opioid substitution and taper.    Successful long-term recovery usually requires medicine, counseling, and group support.    HOME CARE INSTRUCTIONS  Take medicines only as directed by your health care provider.   Check with your health care provider before starting new medicines.  Keep all follow-up visits as directed by your health care provider.    SEEK MEDICAL CARE IF:  You are not able to take your medicines as directed.  Your symptoms get worse.  You relapse.    SEEK IMMEDIATE MEDICAL CARE IF:  You have serious thoughts about hurting yourself or others.  You have a seizure.  You lose consciousness.    ADDITIONAL NOTES AND INSTRUCTIONS    Please follow up with your Primary MD in 24-48 hr.  Seek immediate medical care for any new/worsening signs or symptoms.

## 2021-07-15 NOTE — ED ADULT TRIAGE NOTE - CHIEF COMPLAINT QUOTE
[place] [employer] [facility] [place] [facility] and received total of 4mg Intranasal Narcan. pt A&O4 in triage, ambulating with steady gait.

## 2021-07-15 NOTE — ED PROVIDER NOTE - PATIENT PORTAL LINK FT
You can access the FollowMyHealth Patient Portal offered by [facility] by registering at the following website: [email or web address]. By joining [employer]’s FollowMyHealth portal, you will also be able to view your health information using other applications (apps) compatible with our system.

## 2021-07-15 NOTE — ED PROVIDER NOTE - NS ED ROS FT
Review of Systems:  CONSTITUTIONAL: No fever, No diaphoresis  SKIN: No rash  HEMATOLOGIC: No abnormal bleeding or bruising  EYES: No eye pain, No blurred vision  ENT: No change in hearing, No sore throat, No neck pain, No rhinorrhea  RESPIRATORY: No shortness of breath, No cough  CARDIAC: No chest pain, No palpitations  GI: No abdominal pain, No nausea, No vomiting, No diarrhea  MUSCULOSKELETAL: No joint paint, No swelling, No back pain  NEUROLOGIC: No numbness, No focal weakness, No headache, No dizziness  All other systems negative, unless specified in HPI

## 2021-07-15 NOTE — ED PROVIDER NOTE - PHYSICAL EXAMINATION
CONSTITUTIONAL: in no acute distress, handcuffed to bed  SKIN: Warm, dry  HEAD: Normocephalic; atraumatic  EYES: No conjunctival injection. PERRLA. EOMI  ENT: oropharynx nonerythematous; airway clear  CARD:  Regular rate and rhythm  RESP: CTAB; No wheezes, crackles, rales or rhonchi  ABD: Soft NTND; No guarding or rebound tenderness  EXT: Normal ROM.  No clubbing or cyanosis.  No edema  NEURO: A&O x3, grossly unremarkable, no focal deficits  PSYCH: Cooperative

## 2021-09-27 ENCOUNTER — OUTPATIENT (OUTPATIENT)
Dept: OUTPATIENT SERVICES | Facility: HOSPITAL | Age: 31
LOS: 1 days | Discharge: HOME | End: 2021-09-27

## 2021-10-04 ENCOUNTER — OUTPATIENT (OUTPATIENT)
Dept: OUTPATIENT SERVICES | Facility: HOSPITAL | Age: 31
LOS: 1 days | Discharge: HOME | End: 2021-10-04

## 2021-10-04 DIAGNOSIS — K02.63 DENTAL CARIES ON SMOOTH SURFACE PENETRATING INTO PULP: ICD-10-CM

## 2021-11-25 ENCOUNTER — EMERGENCY (EMERGENCY)
Facility: HOSPITAL | Age: 31
LOS: 0 days | Discharge: HOME | End: 2021-11-25
Attending: EMERGENCY MEDICINE | Admitting: EMERGENCY MEDICINE
Payer: MEDICAID

## 2021-11-25 VITALS
RESPIRATION RATE: 18 BRPM | DIASTOLIC BLOOD PRESSURE: 70 MMHG | HEART RATE: 85 BPM | OXYGEN SATURATION: 98 % | TEMPERATURE: 98 F | SYSTOLIC BLOOD PRESSURE: 140 MMHG

## 2021-11-25 VITALS
DIASTOLIC BLOOD PRESSURE: 73 MMHG | HEART RATE: 91 BPM | SYSTOLIC BLOOD PRESSURE: 146 MMHG | TEMPERATURE: 98 F | OXYGEN SATURATION: 99 % | HEIGHT: 64 IN | RESPIRATION RATE: 18 BRPM | WEIGHT: 149.91 LBS

## 2021-11-25 DIAGNOSIS — S81.811A LACERATION WITHOUT FOREIGN BODY, RIGHT LOWER LEG, INITIAL ENCOUNTER: ICD-10-CM

## 2021-11-25 DIAGNOSIS — M54.50 LOW BACK PAIN, UNSPECIFIED: ICD-10-CM

## 2021-11-25 DIAGNOSIS — S46.912A STRAIN OF UNSPECIFIED MUSCLE, FASCIA AND TENDON AT SHOULDER AND UPPER ARM LEVEL, LEFT ARM, INITIAL ENCOUNTER: ICD-10-CM

## 2021-11-25 DIAGNOSIS — M25.512 PAIN IN LEFT SHOULDER: ICD-10-CM
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
## 2021-11-25 DIAGNOSIS — S90.934A UNSPECIFIED SUPERFICIAL INJURY OF RIGHT LESSER TOE(S), INITIAL ENCOUNTER: ICD-10-CM

## 2021-11-25 PROCEDURE — 73630 X-RAY EXAM OF FOOT: CPT | Mod: 26,RT

## 2021-11-25 PROCEDURE — 99284 EMERGENCY DEPT VISIT MOD MDM: CPT

## 2021-11-25 PROCEDURE — 73030 X-RAY EXAM OF SHOULDER: CPT | Mod: 26,LT

## 2021-11-25 RX ORDER — ACETAMINOPHEN 500 MG
650 TABLET ORAL ONCE
Refills: 0 | Status: COMPLETED | OUTPATIENT
Start: 2021-11-25 | End: 2021-11-25

## 2021-11-25 RX ADMIN — Medication 650 MILLIGRAM(S): at 22:09

## 2021-11-25 NOTE — ED PROVIDER NOTE - OBJECTIVE STATEMENT
32 yo female no sig hx present c/o left shoulder pain and right 3rd to injury s/p tripped and fell earlier today in the morning. pain to left shoulder worsen with any movement. denies head injury and LOC. denies neck and back pain. denies chest pain and abd pain.  also report skin tear around the nail of right 3rd toe.

## 2021-11-25 NOTE — ED PROVIDER NOTE - PATIENT PORTAL LINK FT
You can access the FollowMyHealth Patient Portal offered by [facility] by registering at the following website: [email or web address]. By joining [employer]’s FollowMyHealth portal, you will also be able to view your health information using other applications (apps) compatible with our system.

## 2021-11-25 NOTE — ED PROVIDER NOTE - ATTENDING CONTRIBUTION TO CARE
31yr old female here for eval of fall. pt reports slipping on concrete hitting left shoulder to the ground. From the fall she scraped her right 3rd toe. pt also c/o of lower back pain.     well appearing, nontoxic, awake alert, ncat  no midline spinal ttp,  left paralumbar ttp, ttp left shoulder, pain with rom no left clavicle, ankle, or forearm ttp. mild swelling and ttp right 3rd finger, no pain with compression of ribs, pelvis stable, no bony ext ttp, full rom X 4 s1s2 ctab soft nt/nd, perrl eomi, gcs 15, motor and sensation grossly intact, skin tear to distal right 3rd toe.     impression fall with left shoulder pain, right toe injury and lower back pain. xray of shoulder/finger w/o fracture. lower back back w/o midline ttp. pt placed in sling, advised outpt f/u

## 2021-11-25 NOTE — ED PROVIDER NOTE - NSFOLLOWUPINSTRUCTIONS_ED_ALL_ED_FT
Shoulder Sprain/Strain    A shoulder sprain is a partial or complete tear in one of the tough, fiber-like tissues (ligaments) in the shoulder. The ligaments in the shoulder help to hold the shoulder in place.    What are the causes?  This condition may be caused by:    A fall.  A hit to the shoulder.  A twist of the arm.    What increases the risk?  This condition is more likely to develop in:    People who play sports.  People who have problems with balance or coordination.    What are the signs or symptoms?  Symptoms of this condition include:    Pain when moving the shoulder.  Limited ability to move the shoulder.  Swelling and tenderness on top of the shoulder.  Warmth in the shoulder.  A change in the shape of the shoulder.  Redness or bruising on the shoulder.    How is this diagnosed?  This condition is diagnosed with a physical exam. During the exam, you may be asked to do simple exercises with your shoulder. You may also have imaging tests, such as X-rays, MRI, or a CT scan. These tests can show how severe the sprain is.    How is this treated?  This condition may be treated with:    Rest.  Pain medicine.  Ice.  A sling or brace. This is used to keep the arm still while the shoulder is healing.  Physical therapy or rehabilitation exercises. These help to improve the range of motion and strength of the shoulder.  Surgery (rare). Surgery may be needed if the sprain caused a joint to become unstable. Surgery may also be needed to reduce pain.    Some people may develop ongoing shoulder pain or lose some range of motion in the shoulder. However, most people do not develop long-term problems.    Follow these instructions at home:  ImageRest.  Ask your health care provider when it is safe for you to drive if you have a sling or brace on your shoulder.  Take over-the-counter and prescription medicines only as told by your health care provider.  If directed, apply ice to the area:    Put ice in a plastic bag.  Place a towel between your skin and the bag.  Leave the ice on for 20 minutes, 2–3 times per day.    If you were given a shoulder sling or brace:    Wear it as told.  Remove it to shower or bathe.  Move your arm only as much as told by your health care provider, but keep your hand moving to prevent swelling.    If you were shown how to do any exercises, do them as told by your health care provider.  Keep all follow-up visits as told by your health care provider. This is important.  Contact a health care provider if:  Your pain gets worse.  Your pain is not relieved with medicines.  You have increased redness or swelling.  Get help right away if:  You have a fever.  You cannot move your arm or shoulder.  You develop severe numbness or tingling in your arm, hand, or fingers.  Your arm, hand, or fingers turn blue, white, or gray and feel cold.  This information is not intended to replace advice given to you by your health care provider. Make sure you discuss any questions you have with your health care provider.     Skin Tear Care  A skin tear is a wound in which the top layer of skin peels off. To repair the skin, your doctor may use:  Tape.  Skin adhesive strips.  A bandage (dressing) may also be placed over the tape or skin adhesive strips.    How to care for your skin tear  Clean the wound as told by your doctor. You may be told to keep the wound dry for the first few days. If you are told to clean the wound:  Wash the wound with mild soap and water or a salt–water (saline) solution.  Rinse the wound with water to remove all soap.  Do not rub the wound dry. Let the wound air dry.  Change any dressings as told by your doctor. This includes changing the dressing if it gets wet, gets dirty, or starts to smell bad.  Do not scratch or pick at the wound.  Protect the injured area until it has healed.  Check your wound every day for signs of infection. Check for:  More redness, swelling, or pain.  More fluid or blood.  Warmth.  Pus or a bad smell.  Medicines     Image   Take over-the-counter and prescription medicines only as told by your doctor.  If you were prescribed an antibiotic medicine, take or apply it as told by your doctor. Do not stop using the antibiotic even if your condition gets better.  General instructions     Keep the bandage dry as told by your doctor.  Do not take baths, swim, or do anything that puts your wound underwater until your doctor says it is okay.  Keep all follow-up visits as told by your doctor. This is important.  Contact a doctor if:  You have more redness, swelling, or pain around your wound.  You have more fluid or blood coming from your wound.  Your wound feels warm to the touch.  You have pus or a bad smell coming from your wound.  Get help right away if:  You have a red streak that goes away from the skin tear.  You have a fever and chills and your symptoms suddenly get worse.  This information is not intended to replace advice given to you by your health care provider. Make sure you discuss any questions you have with your health care provider.

## 2021-11-25 NOTE — ED PROVIDER NOTE - CARE PLAN
1 Principal Discharge DX:	Left shoulder strain  Secondary Diagnosis:	Skin tear of lower leg without complication, right, initial encounter

## 2021-11-25 NOTE — ED PROVIDER NOTE - PHYSICAL EXAMINATION
CONSTITUTIONAL: Well-appearing; well-nourished; in no apparent distress.   MS: + ttp to anterior left shoulder with limited ROM to all direction 2/2 pain. non-tender to left elbow/wrist and hand. left hand nv intact. right foot/toes without bony tenderness.   SKIN: minor skin tear around the right 3rd toenail. no bleeding. toenail appeared intact and attacked to nailbed. no nail injury.   NEURO/PSYCH: A & O x 4; grossly unremarkable.

## 2021-11-25 NOTE — ED ADULT NURSE NOTE - INTERVENTIONS DEFINITIONS
[place] to call system/Call bell, personal items and telephone within reach/Physically safe environment: no spills, clutter or unnecessary equipment/Stretcher in lowest position, wheels locked, appropriate side rails in place

## 2021-11-25 NOTE — ED PROVIDER NOTE - NS ED ROS FT
Constitutional: no fever, chills, no recent weight loss, change in appetite or malaise  MS: see HPI  Neuro: No headache or weakness. No LOC.  Skin: see HPI. No skin rash.  Endocrine: No history of thyroid disease or diabetes.

## 2021-11-25 NOTE — ED PROVIDER NOTE - CLINICAL SUMMARY MEDICAL DECISION MAKING FREE TEXT BOX
fall with left shoulder pain, right toe injury and lower back pain. xray of shoulder/finger w/o fracture. lower back back w/o midline ttp. pt placed in sling, advised outpt f/u

## 2021-11-25 NOTE — ED PROVIDER NOTE - CARE PROVIDER_API CALL
Troy Black (MD)  Orthopaedic Surgery; Sports Medicine  [address] [place] [address]  Phone: [phone]  Fax: [phone]  Follow Up Time:

## 2021-11-25 NOTE — ED ADULT TRIAGE NOTE - CHIEF COMPLAINT QUOTE
pt tripped and fell earlier this afternoon on concrete, c/o left arm and shoulder, lower back, and right foot pain. pt reports laceration to right second toe. denies LOC or head injury

## 2021-11-26 NOTE — ED ADULT NURSE NOTE - NS ED NURSE LEVEL OF CONSCIOUSNESS AFFECT
Discharge Summary:     [facility] met w/ Pt to discuss discharge.  Pt reported he is feeling ready to leave the hospital. Pt had admitted voluntarily after ingesting medications in a suicide attempt.  Pt was a 30 day re-admit last at [facility] 10/19/21-10/23/2021. This was Pt's 10th admission to [facility] this year.  Pt reported he had been feeling down for several days prior to his admission and as a result took “a whole lot of his medications.” Pt was unable to quantify what and how much he took. Pt shared he had been staying at his mother’s because his sister kicked him out after learning he was using again. Pt reported he had been using meth, last use was prior to coming to the hospital. Pt's SILVINA upon admission was positive for methamphetamines. JOSS was undetectable. Pt shared his stressors continued to be the death of his son and not wanting to live. Pt has been on the unit for 5 days and was diagnosed: Suicidal ideation, Attention-deficit hyperactivity disorder, predominantly inattentive type, Bipolar 1 disorder, depressed, Anxiety state, unspecified, Pham's esophagus without dysplasia, Polysubstance abuse, Methamphetamine abuse, and Chronic hepatitis C without hepatic coma. Pt did attend most group programming but engaged individually with [facility] following his wellness plan. Pt completed written safety plan with writer and was able to verbalize to [facility] how she will remain safe upon discharge. Pt completed written AODA recovery plan with [facility] and discussed how he will maintain his sobriety. Pt had completed application and billing for residential treatment through [facility]. Pt was waiting to hear if he would be accepted at [facility] when he requested discharge. Pt is encouraged by [facility] to call [facility] on Monday 11/29 and ask for assistance in finishing this referral. Pt is also interested in case mgt and medication monitoring services through [facility]. Pt will be discharging to his  mother’s house and will take the bus there. Due to Pt’s lack of follow through with scheduled appointment Pt was provided provider names for him to call and schedule appointment. Pt indicated he would like his Psychiatrist and therapist to be through [facility] and will be applying for services. Pt reported feeling ready to discharge today.     Michell Villa LCSW, [facility]     Calm

## 2022-01-12 NOTE — ED PROCEDURE NOTE - PROCEDURE NAME, MLM
Splint Cosentyx Counseling:  I discussed with the patient the risks of Cosentyx including but not limited to worsening of Crohn's disease, immunosuppression, allergic reactions and infections.  The patient understands that monitoring is required including a PPD at baseline and must alert us or the primary physician if symptoms of infection or other concerning signs are noted.

## 2023-01-01 NOTE — ED ADULT TRIAGE NOTE - PRO INTERPRETER NEED 2
English
Global muscle tone and symmetry normal; joint contractures absent; periods of alertness noted; grossly responds to touch, light and sound stimuli; gag reflex present; normal suck-swallow patterns for age; cry with normal variation of amplitude and frequency; tongue motility size, and shape normal without atrophy or fasciculations;  deep tendon knee reflexes normal pattern for age; andrea, and grasp reflexes acceptable.

## 2023-09-29 ENCOUNTER — EMERGENCY (EMERGENCY)
Facility: HOSPITAL | Age: 33
LOS: 0 days | Discharge: HOME | End: 2023-09-29
Attending: STUDENT IN AN ORGANIZED HEALTH CARE EDUCATION/TRAINING PROGRAM
Payer: MEDICAID

## 2023-09-29 VITALS
TEMPERATURE: 98 F | RESPIRATION RATE: 18 BRPM | HEART RATE: 91 BPM | DIASTOLIC BLOOD PRESSURE: 84 MMHG | SYSTOLIC BLOOD PRESSURE: 120 MMHG | OXYGEN SATURATION: 100 %

## 2023-09-29 DIAGNOSIS — F32.A DEPRESSION, UNSPECIFIED: ICD-10-CM

## 2023-09-29 DIAGNOSIS — L03.211 CELLULITIS OF FACE: ICD-10-CM

## 2023-09-29 DIAGNOSIS — F41.9 ANXIETY DISORDER, UNSPECIFIED: ICD-10-CM

## 2023-09-29 PROCEDURE — 99283 EMERGENCY DEPT VISIT LOW MDM: CPT

## 2023-09-29 RX ORDER — CEPHALEXIN 500 MG
1 CAPSULE ORAL
Qty: 28 | Refills: 0
Start: 2023-09-29 | End: 2023-10-05

## 2023-09-29 NOTE — ED ADULT TRIAGE NOTE - CHIEF COMPLAINT QUOTE
pt BIBA and police. police were called by family who says pt kept repeating her niece was "stuck in the walls" as per EMS pt family says she does drugs, meth specifically and she just left rehab.   pt denies drug or etoh use, pt calm on exam denies feelings of wanting to hurt herself or others.

## 2023-09-29 NOTE — ED PROVIDER NOTE - CLINICAL SUMMARY MEDICAL DECISION MAKING FREE TEXT BOX
Patient is a 33-year-old female past history of opiate abuse on Suboxone, anxiety depression here for redness to chin intermittently for years progressively getting worse over the past couple days.  Patient denies fever, chills, chest pain, shortness of breath, trouble swallowing. exam showed crusted wound on the lower side of the chin no fluctuance, no redness  pt discharged with keflex and hematology follow up

## 2023-09-29 NOTE — ED ADULT NURSE NOTE - NS ED NOTE ABUSE RESPONSE YN
[FreeTextEntry1] : This is a 39-year-old female with past medical history of GERD, depression, presenting complaining of overall body aches and pains, joint pain, found to be extremely depressed with a PHQ9 score of 20.\par \par Psychiatry: History of depression with severe episode of depression, suicidal ideations, suicidal intent (last episode 2 weeks ago).\par -I spoke with patient for over 25 minutes regarding the need for her to seek immediate help whenever she is having suicidal ideations, to call 911 or to go to the nearest emergency room.\par -The patient will get a referral for mental health coordinator today.\par -Patient will be started on Cymbalta 30 mg once daily, may increase to 30 mg twice a day after 10 days. The patient is not amenable to take medications but she understands that at this point she may need to take something to help her with her depression.\par -Will check Keisha-Barr virus, ESR, CRP, STEPHEN,\par -Patient was asked to return to the office for a complete physical exam.\par \par GI: History of GERD.\par -Continue over-the-counter medications.\par \par Health care maintenance:\par -Position we can refer her for mammogram on her next visit.\par -The patient will provide us with the name of her gynecologist so that we could obtain records.\par - Patient received flu vaccination today. Yes

## 2023-09-29 NOTE — ED PROVIDER NOTE - PHYSICAL EXAMINATION
VITAL SIGNS: I have reviewed nursing notes and confirm.  CONSTITUTIONAL: Well-developed; well-nourished; in no acute distress.   SKIN: mild redness and scab to chin, no fluctuance  HEAD: Normocephalic; atraumatic.  EYES:  conjunctiva and sclera clear.  EXT: Normal ROM.  No clubbing, cyanosis or edema.   NEURO: Alert, oriented, grossly unremarkable

## 2023-09-29 NOTE — ED PROVIDER NOTE - OBJECTIVE STATEMENT
Patient is a 33-year-old female past history of opiate abuse on Suboxone, anxiety depression here for redness to chin intermittently for years progressively getting worse over the past couple days.  Patient denies fever, chills, chest pain, shortness of breath, trouble swallowing

## 2023-09-29 NOTE — ED ADULT NURSE NOTE - NSFALLUNIVINTERV_ED_ALL_ED
BIBA for nausea, abdominal pain , dizziness that started a month ago.
Bed/Stretcher in lowest position, wheels locked, appropriate side rails in place/Call bell, personal items and telephone in reach/Instruct patient to call for assistance before getting out of bed/chair/stretcher/Non-slip footwear applied when patient is off stretcher/[place] to call system/Physically safe environment - no spills, clutter or unnecessary equipment/Purposeful proactive rounding/Room/bathroom lighting operational, light cord in reach

## 2023-09-29 NOTE — ED PROVIDER NOTE - NSFOLLOWUPINSTRUCTIONS_ED_ALL_ED_FT
Our Emergency Department Referral Coordinators will be reaching out to you in the next 24-48 hours from 9:00am to 5:00pm with a follow up appointment. Please expect a phone call from the hospital in that time frame. If you do not receive a call or if you have any questions or concerns, you can reach them at   [phone]

## 2023-09-29 NOTE — ED PROVIDER NOTE - PATIENT PORTAL LINK FT
You can access the FollowMyHealth Patient Portal offered by [facility] by registering at the following website: [email or web address]. By joining [facility]’s FollowMyHealth portal, you will also be able to view your health information using other applications (apps) compatible with our system.

## 2023-09-29 NOTE — ED PROVIDER NOTE - ATTENDING APP SHARED VISIT CONTRIBUTION OF CARE
I have personally performed a history and physical exam on this patient and personally directed the management of the patient.  Patient is a 33-year-old female past history of opiate abuse on Suboxone, anxiety depression here for redness to chin intermittently for years progressively getting worse over the past couple days.  Patient denies fever, chills, chest pain, shortness of breath, trouble swallowing.  CON: appears stated age, pleasant, no acute distress, HENMT: normocephalic, atraumatic, anicteric, no conjunctival injection,  CV: regular rhythm, distal pulses intact, RESP: no acute respiratory distress, no stridor, breathing comfortably on RA , GI:  soft, nontender, no rebound, no guarding, SKIN: there are crusted wounds on the chin, no fluctuance, no redness MSK: no deformities, NEURO: no gross motor or sensory deficit Psychiatric: appropriate mood, appropriate affect

## 2023-10-16 NOTE — ED ADULT NURSE NOTE - SEPSIS REFERENCE DATA FOR CRITERIA 1 WBC
< 4,000 OR > 12,000 Humira Counseling:  I discussed with the patient the risks of adalimumab including but not limited to myelosuppression, immunosuppression, autoimmune hepatitis, demyelinating diseases, lymphoma, and serious infections.  The patient understands that monitoring is required including a PPD at baseline and must alert us or the primary physician if symptoms of infection or other concerning signs are noted.

## 2024-01-29 ENCOUNTER — OUTPATIENT (OUTPATIENT)
Dept: OUTPATIENT SERVICES | Facility: HOSPITAL | Age: 34
LOS: 1 days | End: 2024-01-29

## 2024-01-29 DIAGNOSIS — Z00.8 ENCOUNTER FOR OTHER GENERAL EXAMINATION: ICD-10-CM

## 2024-01-29 LAB
A1C WITH ESTIMATED AVERAGE GLUCOSE RESULT: 5.8 % — HIGH (ref 4–5.6)
ALBUMIN SERPL ELPH-MCNC: 4.5 G/DL — SIGNIFICANT CHANGE UP (ref 3.5–5.2)
ALP SERPL-CCNC: 93 U/L — SIGNIFICANT CHANGE UP (ref 30–115)
ALT FLD-CCNC: 14 U/L — SIGNIFICANT CHANGE UP (ref 0–41)
ANION GAP SERPL CALC-SCNC: 13 MMOL/L — SIGNIFICANT CHANGE UP (ref 7–14)
AST SERPL-CCNC: 15 U/L — SIGNIFICANT CHANGE UP (ref 0–41)
BILIRUB SERPL-MCNC: 0.2 MG/DL — SIGNIFICANT CHANGE UP (ref 0.2–1.2)
BUN SERPL-MCNC: 14 MG/DL — SIGNIFICANT CHANGE UP (ref 10–20)
CALCIUM SERPL-MCNC: 9.2 MG/DL — SIGNIFICANT CHANGE UP (ref 8.4–10.5)
CHLORIDE SERPL-SCNC: 104 MMOL/L — SIGNIFICANT CHANGE UP (ref 98–110)
CHOLEST SERPL-MCNC: 157 MG/DL — SIGNIFICANT CHANGE UP
CO2 SERPL-SCNC: 24 MMOL/L — SIGNIFICANT CHANGE UP (ref 17–32)
CREAT SERPL-MCNC: 0.6 MG/DL — LOW (ref 0.7–1.5)
EGFR: 120 ML/MIN/1.73M2 — SIGNIFICANT CHANGE UP
ESTIMATED AVERAGE GLUCOSE: 120 MG/DL — HIGH (ref 68–114)
GLUCOSE SERPL-MCNC: 95 MG/DL — SIGNIFICANT CHANGE UP (ref 70–99)
HCT VFR BLD CALC: 45 % — SIGNIFICANT CHANGE UP (ref 37–47)
HDLC SERPL-MCNC: 61 MG/DL — SIGNIFICANT CHANGE UP
HGB BLD-MCNC: 13.8 G/DL — SIGNIFICANT CHANGE UP (ref 12–16)
LIPID PNL WITH DIRECT LDL SERPL: 82 MG/DL — SIGNIFICANT CHANGE UP
MAGNESIUM SERPL-MCNC: 2 MG/DL — SIGNIFICANT CHANGE UP (ref 1.8–2.4)
MCHC RBC-ENTMCNC: 28.5 PG — SIGNIFICANT CHANGE UP (ref 27–31)
MCHC RBC-ENTMCNC: 30.7 G/DL — LOW (ref 32–37)
MCV RBC AUTO: 92.8 FL — SIGNIFICANT CHANGE UP (ref 81–99)
NON HDL CHOLESTEROL: 96 MG/DL — SIGNIFICANT CHANGE UP
NRBC # BLD: 0 /100 WBCS — SIGNIFICANT CHANGE UP (ref 0–0)
PLATELET # BLD AUTO: 288 K/UL — SIGNIFICANT CHANGE UP (ref 130–400)
PMV BLD: 10.6 FL — HIGH (ref 7.4–10.4)
POTASSIUM SERPL-MCNC: 4.6 MMOL/L — SIGNIFICANT CHANGE UP (ref 3.5–5)
POTASSIUM SERPL-SCNC: 4.6 MMOL/L — SIGNIFICANT CHANGE UP (ref 3.5–5)
PROT SERPL-MCNC: 7 G/DL — SIGNIFICANT CHANGE UP (ref 6–8)
RBC # BLD: 4.85 M/UL — SIGNIFICANT CHANGE UP (ref 4.2–5.4)
RBC # FLD: 13.1 % — SIGNIFICANT CHANGE UP (ref 11.5–14.5)
SODIUM SERPL-SCNC: 141 MMOL/L — SIGNIFICANT CHANGE UP (ref 135–146)
TRIGL SERPL-MCNC: 72 MG/DL — SIGNIFICANT CHANGE UP
WBC # BLD: 6.39 K/UL — SIGNIFICANT CHANGE UP (ref 4.8–10.8)
WBC # FLD AUTO: 6.39 K/UL — SIGNIFICANT CHANGE UP (ref 4.8–10.8)

## 2024-01-29 PROCEDURE — 81025 URINE PREGNANCY TEST: CPT

## 2024-01-29 PROCEDURE — 83735 ASSAY OF MAGNESIUM: CPT

## 2024-01-29 PROCEDURE — 85027 COMPLETE CBC AUTOMATED: CPT

## 2024-01-29 PROCEDURE — 81003 URINALYSIS AUTO W/O SCOPE: CPT

## 2024-01-29 PROCEDURE — 83036 HEMOGLOBIN GLYCOSYLATED A1C: CPT

## 2024-01-29 PROCEDURE — 80074 ACUTE HEPATITIS PANEL: CPT

## 2024-01-29 PROCEDURE — 86480 TB TEST CELL IMMUN MEASURE: CPT

## 2024-01-29 PROCEDURE — 80053 COMPREHEN METABOLIC PANEL: CPT

## 2024-01-29 PROCEDURE — 80061 LIPID PANEL: CPT

## 2024-01-29 PROCEDURE — 86780 TREPONEMA PALLIDUM: CPT

## 2024-01-30 DIAGNOSIS — Z00.8 ENCOUNTER FOR OTHER GENERAL EXAMINATION: ICD-10-CM

## 2024-01-30 LAB
APPEARANCE UR: CLEAR — SIGNIFICANT CHANGE UP
BILIRUB UR-MCNC: NEGATIVE — SIGNIFICANT CHANGE UP
COLOR SPEC: YELLOW — SIGNIFICANT CHANGE UP
DIFF PNL FLD: NEGATIVE — SIGNIFICANT CHANGE UP
GLUCOSE UR QL: NEGATIVE MG/DL — SIGNIFICANT CHANGE UP
HAV IGM SER-ACNC: SIGNIFICANT CHANGE UP
HBV CORE IGM SER-ACNC: SIGNIFICANT CHANGE UP
HBV SURFACE AG SER-ACNC: SIGNIFICANT CHANGE UP
HCG UR QL: NEGATIVE — SIGNIFICANT CHANGE UP
HCV AB S/CO SERPL IA: 0.44 S/CO — SIGNIFICANT CHANGE UP (ref 0–0.99)
HCV AB SERPL-IMP: SIGNIFICANT CHANGE UP
KETONES UR-MCNC: NEGATIVE MG/DL — SIGNIFICANT CHANGE UP
LEUKOCYTE ESTERASE UR-ACNC: NEGATIVE — SIGNIFICANT CHANGE UP
NITRITE UR-MCNC: NEGATIVE — SIGNIFICANT CHANGE UP
PH UR: 6 — SIGNIFICANT CHANGE UP (ref 5–8)
PROT UR-MCNC: NEGATIVE MG/DL — SIGNIFICANT CHANGE UP
SP GR SPEC: 1.01 — SIGNIFICANT CHANGE UP (ref 1–1.03)
T PALLIDUM AB TITR SER: NEGATIVE — SIGNIFICANT CHANGE UP
UROBILINOGEN FLD QL: 0.2 MG/DL — SIGNIFICANT CHANGE UP (ref 0.2–1)

## 2024-01-31 ENCOUNTER — OUTPATIENT (OUTPATIENT)
Dept: OUTPATIENT SERVICES | Facility: HOSPITAL | Age: 34
LOS: 1 days | End: 2024-01-31
Payer: MEDICAID

## 2024-01-31 DIAGNOSIS — I49.9 CARDIAC ARRHYTHMIA, UNSPECIFIED: ICD-10-CM

## 2024-01-31 PROCEDURE — 93005 ELECTROCARDIOGRAM TRACING: CPT

## 2024-01-31 PROCEDURE — 93010 ELECTROCARDIOGRAM REPORT: CPT

## 2024-02-01 DIAGNOSIS — I49.9 CARDIAC ARRHYTHMIA, UNSPECIFIED: ICD-10-CM

## 2024-02-01 LAB
GAMMA INTERFERON BACKGROUND BLD IA-ACNC: 0.02 IU/ML — SIGNIFICANT CHANGE UP
M TB IFN-G BLD-IMP: NEGATIVE — SIGNIFICANT CHANGE UP
M TB IFN-G CD4+ BCKGRND COR BLD-ACNC: -0.01 IU/ML — SIGNIFICANT CHANGE UP
M TB IFN-G CD4+CD8+ BCKGRND COR BLD-ACNC: -0.01 IU/ML — SIGNIFICANT CHANGE UP
QUANT TB PLUS MITOGEN MINUS NIL: 2.71 IU/ML — SIGNIFICANT CHANGE UP

## 2024-04-30 NOTE — ED BEHAVIORAL HEALTH ASSESSMENT NOTE - NS ED BHA MED ROS ENT MOUTH
[de-identified] : Physical exam of B/L knees:  -Mild swelling of right knee.  Skin intact -No TTP of knee joints bilaterally -Calf is soft and nontender -Full range of motion of the knee, discomfort with flexion bilaterally No complaints

## 2024-06-15 ENCOUNTER — EMERGENCY (EMERGENCY)
Facility: HOSPITAL | Age: 34
LOS: 0 days | Discharge: HOME | End: 2024-06-16
Attending: EMERGENCY MEDICINE | Admitting: HOSPITALIST
Payer: MEDICAID

## 2024-06-15 VITALS
DIASTOLIC BLOOD PRESSURE: 92 MMHG | SYSTOLIC BLOOD PRESSURE: 163 MMHG | WEIGHT: 160.06 LBS | HEART RATE: 73 BPM | OXYGEN SATURATION: 100 % | HEIGHT: 64 IN | RESPIRATION RATE: 8 BRPM | TEMPERATURE: 98 F

## 2024-06-15 DIAGNOSIS — F19.10 OTHER PSYCHOACTIVE SUBSTANCE ABUSE, UNCOMPLICATED: ICD-10-CM

## 2024-06-15 LAB
ALBUMIN SERPL ELPH-MCNC: 4.5 G/DL — SIGNIFICANT CHANGE UP (ref 3.5–5.2)
ALP SERPL-CCNC: 72 U/L — SIGNIFICANT CHANGE UP (ref 30–115)
ALT FLD-CCNC: 16 U/L — SIGNIFICANT CHANGE UP (ref 0–41)
AMPHET UR-MCNC: NEGATIVE — SIGNIFICANT CHANGE UP
ANION GAP SERPL CALC-SCNC: 15 MMOL/L — HIGH (ref 7–14)
APAP SERPL-MCNC: <5 UG/ML — LOW (ref 10–30)
AST SERPL-CCNC: 20 U/L — SIGNIFICANT CHANGE UP (ref 0–41)
BARBITURATES UR SCN-MCNC: NEGATIVE — SIGNIFICANT CHANGE UP
BENZODIAZ UR-MCNC: NEGATIVE — SIGNIFICANT CHANGE UP
BILIRUB SERPL-MCNC: <0.2 MG/DL — SIGNIFICANT CHANGE UP (ref 0.2–1.2)
BUN SERPL-MCNC: 9 MG/DL — LOW (ref 10–20)
CALCIUM SERPL-MCNC: 8.8 MG/DL — SIGNIFICANT CHANGE UP (ref 8.4–10.5)
CHLORIDE SERPL-SCNC: 98 MMOL/L — SIGNIFICANT CHANGE UP (ref 98–110)
CO2 SERPL-SCNC: 24 MMOL/L — SIGNIFICANT CHANGE UP (ref 17–32)
COCAINE METAB.OTHER UR-MCNC: NEGATIVE — SIGNIFICANT CHANGE UP
CREAT SERPL-MCNC: 0.6 MG/DL — LOW (ref 0.7–1.5)
DRUG SCREEN 1, URINE RESULT: SIGNIFICANT CHANGE UP
EGFR: 119 ML/MIN/1.73M2 — SIGNIFICANT CHANGE UP
FENTANYL UR QL: POSITIVE
GLUCOSE SERPL-MCNC: 91 MG/DL — SIGNIFICANT CHANGE UP (ref 70–99)
HCG SERPL QL: NEGATIVE — SIGNIFICANT CHANGE UP
HCG UR QL: NEGATIVE — SIGNIFICANT CHANGE UP
HCT VFR BLD CALC: 38 % — SIGNIFICANT CHANGE UP (ref 37–47)
HGB BLD-MCNC: 12.4 G/DL — SIGNIFICANT CHANGE UP (ref 12–16)
MCHC RBC-ENTMCNC: 31.2 PG — HIGH (ref 27–31)
MCHC RBC-ENTMCNC: 32.6 G/DL — SIGNIFICANT CHANGE UP (ref 32–37)
MCV RBC AUTO: 95.5 FL — SIGNIFICANT CHANGE UP (ref 81–99)
METHADONE UR-MCNC: NEGATIVE — SIGNIFICANT CHANGE UP
NRBC # BLD: 0 /100 WBCS — SIGNIFICANT CHANGE UP (ref 0–0)
OPIATES UR-MCNC: NEGATIVE — SIGNIFICANT CHANGE UP
OXYCODONE UR-MCNC: NEGATIVE — SIGNIFICANT CHANGE UP
PCP UR-MCNC: NEGATIVE — SIGNIFICANT CHANGE UP
PLATELET # BLD AUTO: 248 K/UL — SIGNIFICANT CHANGE UP (ref 130–400)
PMV BLD: 10.7 FL — HIGH (ref 7.4–10.4)
POTASSIUM SERPL-MCNC: 3.5 MMOL/L — SIGNIFICANT CHANGE UP (ref 3.5–5)
POTASSIUM SERPL-SCNC: 3.5 MMOL/L — SIGNIFICANT CHANGE UP (ref 3.5–5)
PROPOXYPHENE QUALITATIVE URINE RESULT: NEGATIVE — SIGNIFICANT CHANGE UP
PROT SERPL-MCNC: 6.8 G/DL — SIGNIFICANT CHANGE UP (ref 6–8)
RBC # BLD: 3.98 M/UL — LOW (ref 4.2–5.4)
RBC # FLD: 12.5 % — SIGNIFICANT CHANGE UP (ref 11.5–14.5)
SALICYLATES SERPL-MCNC: <0.3 MG/DL — LOW (ref 4–30)
SODIUM SERPL-SCNC: 137 MMOL/L — SIGNIFICANT CHANGE UP (ref 135–146)
THC UR QL: POSITIVE
WBC # BLD: 6.92 K/UL — SIGNIFICANT CHANGE UP (ref 4.8–10.8)
WBC # FLD AUTO: 6.92 K/UL — SIGNIFICANT CHANGE UP (ref 4.8–10.8)

## 2024-06-15 PROCEDURE — 80307 DRUG TEST PRSMV CHEM ANLYZR: CPT

## 2024-06-15 PROCEDURE — 82962 GLUCOSE BLOOD TEST: CPT

## 2024-06-15 PROCEDURE — 85025 COMPLETE CBC W/AUTO DIFF WBC: CPT

## 2024-06-15 PROCEDURE — 96375 TX/PRO/DX INJ NEW DRUG ADDON: CPT

## 2024-06-15 PROCEDURE — 36415 COLL VENOUS BLD VENIPUNCTURE: CPT

## 2024-06-15 PROCEDURE — 93005 ELECTROCARDIOGRAM TRACING: CPT

## 2024-06-15 PROCEDURE — 80354 DRUG SCREENING FENTANYL: CPT

## 2024-06-15 PROCEDURE — 73030 X-RAY EXAM OF SHOULDER: CPT | Mod: RT

## 2024-06-15 PROCEDURE — 99285 EMERGENCY DEPT VISIT HI MDM: CPT

## 2024-06-15 PROCEDURE — 81025 URINE PREGNANCY TEST: CPT

## 2024-06-15 PROCEDURE — 80053 COMPREHEN METABOLIC PANEL: CPT

## 2024-06-15 PROCEDURE — 96374 THER/PROPH/DIAG INJ IV PUSH: CPT

## 2024-06-15 PROCEDURE — 84703 CHORIONIC GONADOTROPIN ASSAY: CPT

## 2024-06-15 PROCEDURE — 80349 CANNABINOIDS NATURAL: CPT

## 2024-06-15 PROCEDURE — 85027 COMPLETE CBC AUTOMATED: CPT

## 2024-06-15 RX ORDER — CHLORHEXIDINE GLUCONATE 213 G/1000ML
1 SOLUTION TOPICAL
Refills: 0 | Status: DISCONTINUED | OUTPATIENT
Start: 2024-06-15 | End: 2024-06-16

## 2024-06-15 RX ORDER — THIAMINE MONONITRATE (VIT B1) 100 MG
100 TABLET ORAL DAILY
Refills: 0 | Status: DISCONTINUED | OUTPATIENT
Start: 2024-06-15 | End: 2024-06-16

## 2024-06-15 RX ORDER — GABAPENTIN 400 MG/1
1 CAPSULE ORAL
Qty: 0 | Refills: 0 | DISCHARGE

## 2024-06-15 RX ORDER — ONDANSETRON 8 MG/1
4 TABLET, FILM COATED ORAL EVERY 8 HOURS
Refills: 0 | Status: DISCONTINUED | OUTPATIENT
Start: 2024-06-15 | End: 2024-06-16

## 2024-06-15 RX ORDER — SODIUM CHLORIDE 9 MG/ML
1000 INJECTION INTRAMUSCULAR; INTRAVENOUS; SUBCUTANEOUS
Refills: 0 | Status: DISCONTINUED | OUTPATIENT
Start: 2024-06-15 | End: 2024-06-16

## 2024-06-15 RX ORDER — SODIUM CHLORIDE 9 MG/ML
1000 INJECTION INTRAMUSCULAR; INTRAVENOUS; SUBCUTANEOUS ONCE
Refills: 0 | Status: COMPLETED | OUTPATIENT
Start: 2024-06-15 | End: 2024-06-15

## 2024-06-15 RX ORDER — DEXTROAMPHETAMINE SACCHARATE, AMPHETAMINE ASPARTATE, DEXTROAMPHETAMINE SULFATE AND AMPHETAMINE SULFATE 1.875; 1.875; 1.875; 1.875 MG/1; MG/1; MG/1; MG/1
1 TABLET ORAL
Qty: 0 | Refills: 0 | DISCHARGE

## 2024-06-15 RX ORDER — NALOXONE HYDROCHLORIDE 4 MG/.1ML
0.4 SPRAY NASAL ONCE
Refills: 0 | Status: COMPLETED | OUTPATIENT
Start: 2024-06-15 | End: 2024-06-15

## 2024-06-15 RX ORDER — ARIPIPRAZOLE 15 MG/1
1 TABLET ORAL
Qty: 0 | Refills: 0 | DISCHARGE

## 2024-06-15 RX ORDER — FOLIC ACID 0.8 MG
1 TABLET ORAL DAILY
Refills: 0 | Status: DISCONTINUED | OUTPATIENT
Start: 2024-06-15 | End: 2024-06-16

## 2024-06-15 RX ORDER — ENOXAPARIN SODIUM 100 MG/ML
40 INJECTION SUBCUTANEOUS EVERY 24 HOURS
Refills: 0 | Status: DISCONTINUED | OUTPATIENT
Start: 2024-06-15 | End: 2024-06-16

## 2024-06-15 RX ORDER — ACETAMINOPHEN 500 MG
650 TABLET ORAL EVERY 6 HOURS
Refills: 0 | Status: DISCONTINUED | OUTPATIENT
Start: 2024-06-15 | End: 2024-06-16

## 2024-06-15 RX ADMIN — ONDANSETRON 4 MILLIGRAM(S): 8 TABLET, FILM COATED ORAL at 20:12

## 2024-06-15 RX ADMIN — SODIUM CHLORIDE 1000 MILLILITER(S): 9 INJECTION INTRAMUSCULAR; INTRAVENOUS; SUBCUTANEOUS at 07:57

## 2024-06-15 RX ADMIN — SODIUM CHLORIDE 100 MILLILITER(S): 9 INJECTION INTRAMUSCULAR; INTRAVENOUS; SUBCUTANEOUS at 14:21

## 2024-06-15 RX ADMIN — Medication 650 MILLIGRAM(S): at 20:12

## 2024-06-15 RX ADMIN — NALOXONE HYDROCHLORIDE 0.4 MILLIGRAM(S): 4 SPRAY NASAL at 07:57

## 2024-06-15 RX ADMIN — Medication 650 MILLIGRAM(S): at 21:30

## 2024-06-15 NOTE — ED PROVIDER NOTE - PHYSICAL EXAMINATION
On physical exam patient is normocephalic atraumatic pupils are constricted bilaterally equal oropharynx clear chest clear to auscultation bilaterally abdomen soft nontender nondistended bowel sounds positive radial pulses 2+ pedal pulses 2+ no other signs of trauma

## 2024-06-15 NOTE — H&P ADULT - NS ATTEND AMEND GEN_ALL_CORE FT
Patient presents to ED hemodynamically stable after suspected opiate overdose.  Can keep on telemetry for 24h, start Librium protocol as ETOH history is questionable and patient was drinking around the time of opiate OD.

## 2024-06-15 NOTE — PATIENT PROFILE ADULT - SAFE PLACE TO LIVE - DETAILS
pt states she has  from her partner (which whom she resided) and is currently staying with her mother temporarily but needs help finding a place to live permanently

## 2024-06-15 NOTE — H&P ADULT - HISTORY OF PRESENT ILLNESS
34F w/PMHx of Opiate dependence on Suboxone presents to ED BIBA 2/2 OD.  Patient reports smoked heroin last night and then went to a bar.  SHe had 2 drinks and then shortly after passed out.  EMS called at bar she was at and brought patient to ED.  Patient s/p 2 rounds of Narcan and more responsive  Patient reports that she smoked the heroin approx 2-3 prior to her episode.  No complaints of CP/SOB.  No abdominal or urinary complaints.  Patient reports had been clean since 12/23 with 1 episode where she smoked heroin in April.  No since.  Also admits to using Clonazepam, approx 4-5 0.5mg tabs over the past 3 days.  Reports she does not drink regularly.

## 2024-06-15 NOTE — CONSULT NOTE ADULT - SUBJECTIVE AND OBJECTIVE BOX
34 year old woman with substance use disorder notably heroin/alcohol/benzodiazepines presents for suspected overdose. Pt was at bar where she was noted to have passed out, EMS was called. Pt responded to narcan. Pt reports smoking heorin and taking xanax earlier in the day and then having several drinks in the evening.     Mother at bedside states pt has had ongoing issues with substance use since she was 16, has been in rehab several times and in the hospital for drug use and associated complications, unsure if daughter was ever hospitalized for overdose.     Vitals in triage notable for RR of 8. Pt hemodynamically stable. No significant lab abnormalities.    Pt was given 1L fluid bolus and narcan 0.4mg once in ED.     Pt is lethargic but awakens to verbal stimuli and follows commands. diaphoretic, intact pulse, no cyanosis.  Placed on end tidal CO2 in ED.   no blood gas available.    ICU Vital Signs Last 24 Hrs  T(C): 36.7 (15 Darrius 2024 05:57), Max: 36.7 (15 Darrius 2024 05:57)  T(F): 98.1 (15 Darrius 2024 05:57), Max: 98.1 (15 Darrius 2024 05:57)  HR: 71 (15 Darrius 2024 09:39) (65 - 73)  BP: 118/62 (15 Darrius 2024 09:39) (118/62 - 163/92)  BP(mean): --  ABP: --  ABP(mean): --  RR: 12 (15 Darrius 2024 10:14) (8 - 12)  SpO2: 99% (15 Darrius 2024 10:14) (90% - 100%)    O2 Parameters below as of 15 Darrius 2024 10:14  Patient On (Oxygen Delivery Method): nasal cannula  O2 Flow (L/min): 2      LABS: Personally reviewed labs, imaging, and ECG                          12.4   6.92  )-----------( 248      ( 15 Darrius 2024 07:30 )             38.0       06-15    137  |  98  |  9<L>  ----------------------------<  91  3.5   |  24  |  0.6<L>    Ca    8.8      15 Darrius 2024 07:30    TPro  6.8  /  Alb  4.5  /  TBili  <0.2  /  DBili  x   /  AST  20  /  ALT  16  /  AlkPhos  72  06-15       LIVER FUNCTIONS - ( 15 Darrius 2024 07:30 )  Alb: 4.5 g/dL / Pro: 6.8 g/dL / ALK PHOS: 72 U/L / ALT: 16 U/L / AST: 20 U/L / GGT: x                    Urinalysis Basic - ( 15 Darrius 2024 07:30 )    Color: x / Appearance: x / SG: x / pH: x  Gluc: 91 mg/dL / Ketone: x  / Bili: x / Urobili: x   Blood: x / Protein: x / Nitrite: x   Leuk Esterase: x / RBC: x / WBC x   Sq Epi: x / Non Sq Epi: x / Bacteria: x            Lactate Trend            CAPILLARY BLOOD GLUCOSE      POCT Blood Glucose.: 96 mg/dL (15 Darrius 2024 04:06)        RADIOLOGY & ADDITIONAL TESTS:       ROS  - no HA, no SOB, no CP, no abdominal pain    physical exam  - diaphoretic, lethargic, normal heart sounds, CTABL, no edema, no cyanosis of lips  - HR 80-90, SaO2 99% on 1L NC, end tidal co2 35-45

## 2024-06-15 NOTE — CONSULT NOTE ADULT - NS ATTEND AMEND GEN_ALL_CORE FT
A&P    34 year old woman with substance use disorder presents after episode of depressed mental status and bradypnea after ingesting opiates and alcohol and benzodiazpines responded to narcan; protecting airway, lethargic but awakens easily with following simple commands and providing appropriate history.    # respiratory depression secondary to alcohol/ opiate and benzodiazepine use  - patient responded to narcan  - pt is protecting airway  - pt pulse oximetry showing adequate oxygenation    PLAN  - hold sedatives  - give narcan if pt mental status worsens; becomes obtunded  - ciwa symptom triggered protocol  - thiamine 500g IV x3 days  - folate daily  - utox or serum drug screen  - Catch team consult  - obtain venous blood gas  - HOB 45 deg  - wean O2 as tolerated  - monitor on tele  - monitor blood pressure  - feed patient when mental status improved  - gentle IV hydration while patient not eating  - monitor electrolytes; replete as needed  - DVT ppx  - increase activity as tolerated  - vital signs q8 hour  - please reach out to critical care/resident team if any change in HD status, or mental status does not improve s/p narcan or any additional concerns

## 2024-06-15 NOTE — ED PROVIDER NOTE - CRITICAL CARE ATTENDING CONTRIBUTION TO CARE
I have personally performed a history and physical exam on this patient and personally directed the management of the patient. Patient is a 34-year-old female prior history of substance abuse presents after being found altered with 3 tachypnea after ingesting a combination of alcohol and opiates recreationally patient's history is corroborated by the patient's mother who is here at bedside as well as EMTs patient is sleeping arousable able to answer question and then falls back asleep she is protecting her airway denies any other symptoms mom corroborates his history of substance abuse states that she highly suspect patient had relapse with opioids    On physical exam patient is normocephalic atraumatic pupils are constricted bilaterally equal oropharynx clear chest clear to auscultation bilaterally abdomen soft nontender nondistended bowel sounds positive radial pulses 2+ pedal pulses 2+ no other signs of trauma    Assessment plan patient presents for evaluation of respiratory depression likely secondary to polysubstance abuse moguls likely consistent with opioids patient was given 0.4 mg of Narcan x 2 with inappropriate response she was placed on end-tidal capnography which has been normal no signs of apnea furthermore O2 saturation of 100% here in the emergency department given the fact that the patient is not back to baseline at this point plan to admission I did discuss case with PCP who advised consulting ICU for further evaluation mom updated and agrees with plan of care

## 2024-06-15 NOTE — ED ADULT NURSE NOTE - NSFALLOOBATTEMPT_ED_ALL_ED
MSW phoned patient to notify her forms were updated and fax  MSW will follow up with Timn to confirm receipt  No

## 2024-06-15 NOTE — ED PROVIDER NOTE - CLINICAL SUMMARY MEDICAL DECISION MAKING FREE TEXT BOX
Patient presented status post suspected opiate overdose responded to Narcan for admission monitored setting

## 2024-06-15 NOTE — H&P ADULT - ASSESSMENT
34F w/PMHx of Opiate dependence on Suboxone presents to ED BIBA 2/2 OD, admitted to medicine service for further management.    #AM 2/2 suspected Opiate Over dose  #Polysubstance Dependence  - admit to medicine service  - monitor on telemetry  - Librium CIWA  - Hold Subxone for now pending detox eval  - Detox c/s  - FU uTox  - IVF  - symptom control    Diet: DASH  Activity: OOB  DVT PPX: Lovenox  GI PPX: Not indicated  Code status: Full  Dispo: Home  CHG 2% Wipes QD  34F w/PMHx of Opiate dependence on Suboxone presents to ED BIBA 2/2 OD, admitted to medicine service for further management.    #AM 2/2 suspected Opiate Over dose  #Polysubstance Dependence  - admit to medicine service  - monitor on telemetry  - Librium CIWA  - MVI, Folic acid, Thiamine QD  - Hold Suboxone for now pending detox eval  - Detox c/s  - FU uTox  - IVF  - symptom control    Diet: DASH  Activity: OOB  DVT PPX: Lovenox  GI PPX: Not indicated  Code status: Full  Dispo: Home  CHG 2% Wipes QD  34F w/PMHx of Opiate dependence on Suboxone presents to ED BIBA 2/2 OD, admitted to medicine service for further management.    #AM 2/2 suspected Opiate Over dose  #Polysubstance Dependence  - admit to medicine service  - monitor on telemetry  - Librium CIWA  - MVI, Folic acid, Thiamine QDaily  - Hold Suboxone for now pending detox eval  - Detox c/s  - FU uTox  - IVF  - symptom control    Diet: DASH  Activity: OOB  DVT PPX: Lovenox  GI PPX: Not indicated  Code status: Full  Dispo: Home  CHG 2% Wipes QD

## 2024-06-15 NOTE — PATIENT PROFILE ADULT - FUNCTIONAL ASSESSMENT - BASIC MOBILITY 6.
4-calculated by average/Not able to assess (calculate score using [facility] averaging method)

## 2024-06-15 NOTE — ED PROVIDER NOTE - OBJECTIVE STATEMENT
Patient is a 34-year-old female prior history of substance abuse presents after being found altered with 3 tachypnea after ingesting a combination of alcohol and opiates recreationally patient's history is corroborated by the patient's mother who is here at bedside as well as EMTs patient is sleeping arousable able to answer question and then falls back asleep she is protecting her airway denies any other symptoms mom corroborates his history of substance abuse states that she highly suspect patient had relapse with opioids

## 2024-06-15 NOTE — H&P ADULT - NSHPLABSRESULTS_GEN_ALL_CORE
12.4   6.92  )-----------( 248      ( 15 Darrius 2024 07:30 )             38.0       06-15    137  |  98  |  9<L>  ----------------------------<  91  3.5   |  24  |  0.6<L>    Ca    8.8      15 Darrius 2024 07:30    TPro  6.8  /  Alb  4.5  /  TBili  <0.2  /  DBili  x   /  AST  20  /  ALT  16  /  AlkPhos  72  06-15          Urinalysis Basic - ( 15 Darrius 2024 07:30 )    Color: x / Appearance: x / SG: x / pH: x  Gluc: 91 mg/dL / Ketone: x  / Bili: x / Urobili: x   Blood: x / Protein: x / Nitrite: x   Leuk Esterase: x / RBC: x / WBC x   Sq Epi: x / Non Sq Epi: x / Bacteria: x    Lactate Trend    Serum Pregnancy: Negative (06-15-24 @ 07:30)

## 2024-06-15 NOTE — DOWNTIME INTERRUPTION NOTE - TIME SYSTEM AVAILABLE AGAIN
Tatianna calls this am c/o severe cough, chest congestion, SOB and muscle aches.  As Tatianna does not have a PCP, she was advised to present to [facility]  for evaluation. Pt verbalized understanding.    15-Darrius-2024 06:00

## 2024-06-15 NOTE — ED PROVIDER NOTE - PROGRESS NOTE DETAILS
Patient signed out to me by Dr. Yeboah at 09 100, had presented with drug overdose requiring 2 rounds of Narcan, has been stable with normal respiratory rate and depth, discussed case with Dr. Leiva intensivist who recommends admission to telemetry, hospitalist aware, see downtime chart for further documentation

## 2024-06-16 VITALS
DIASTOLIC BLOOD PRESSURE: 71 MMHG | OXYGEN SATURATION: 98 % | SYSTOLIC BLOOD PRESSURE: 114 MMHG | RESPIRATION RATE: 16 BRPM | HEART RATE: 66 BPM

## 2024-06-16 LAB
ALBUMIN SERPL ELPH-MCNC: 3.3 G/DL — LOW (ref 3.5–5.2)
ALP SERPL-CCNC: 58 U/L — SIGNIFICANT CHANGE UP (ref 30–115)
ALT FLD-CCNC: 11 U/L — SIGNIFICANT CHANGE UP (ref 0–41)
ANION GAP SERPL CALC-SCNC: 7 MMOL/L — SIGNIFICANT CHANGE UP (ref 7–14)
AST SERPL-CCNC: 12 U/L — SIGNIFICANT CHANGE UP (ref 0–41)
BASOPHILS # BLD AUTO: 0.03 K/UL — SIGNIFICANT CHANGE UP (ref 0–0.2)
BASOPHILS NFR BLD AUTO: 0.9 % — SIGNIFICANT CHANGE UP (ref 0–1)
BILIRUB SERPL-MCNC: <0.2 MG/DL — SIGNIFICANT CHANGE UP (ref 0.2–1.2)
BUN SERPL-MCNC: 10 MG/DL — SIGNIFICANT CHANGE UP (ref 10–20)
CALCIUM SERPL-MCNC: 7.9 MG/DL — LOW (ref 8.4–10.5)
CHLORIDE SERPL-SCNC: 109 MMOL/L — SIGNIFICANT CHANGE UP (ref 98–110)
CO2 SERPL-SCNC: 26 MMOL/L — SIGNIFICANT CHANGE UP (ref 17–32)
CREAT SERPL-MCNC: 0.5 MG/DL — LOW (ref 0.7–1.5)
EGFR: 125 ML/MIN/1.73M2 — SIGNIFICANT CHANGE UP
EOSINOPHIL # BLD AUTO: 0.06 K/UL — SIGNIFICANT CHANGE UP (ref 0–0.7)
EOSINOPHIL NFR BLD AUTO: 1.7 % — SIGNIFICANT CHANGE UP (ref 0–8)
GLUCOSE SERPL-MCNC: 99 MG/DL — SIGNIFICANT CHANGE UP (ref 70–99)
HCT VFR BLD CALC: 32.6 % — LOW (ref 37–47)
HGB BLD-MCNC: 10.7 G/DL — LOW (ref 12–16)
IMM GRANULOCYTES NFR BLD AUTO: 0.3 % — SIGNIFICANT CHANGE UP (ref 0.1–0.3)
LYMPHOCYTES # BLD AUTO: 1.26 K/UL — SIGNIFICANT CHANGE UP (ref 1.2–3.4)
LYMPHOCYTES # BLD AUTO: 36.1 % — SIGNIFICANT CHANGE UP (ref 20.5–51.1)
MCHC RBC-ENTMCNC: 31.3 PG — HIGH (ref 27–31)
MCHC RBC-ENTMCNC: 32.8 G/DL — SIGNIFICANT CHANGE UP (ref 32–37)
MCV RBC AUTO: 95.3 FL — SIGNIFICANT CHANGE UP (ref 81–99)
MONOCYTES # BLD AUTO: 0.29 K/UL — SIGNIFICANT CHANGE UP (ref 0.1–0.6)
MONOCYTES NFR BLD AUTO: 8.3 % — SIGNIFICANT CHANGE UP (ref 1.7–9.3)
NEUTROPHILS # BLD AUTO: 1.84 K/UL — SIGNIFICANT CHANGE UP (ref 1.4–6.5)
NEUTROPHILS NFR BLD AUTO: 52.7 % — SIGNIFICANT CHANGE UP (ref 42.2–75.2)
NRBC # BLD: 0 /100 WBCS — SIGNIFICANT CHANGE UP (ref 0–0)
PLATELET # BLD AUTO: 188 K/UL — SIGNIFICANT CHANGE UP (ref 130–400)
PMV BLD: 10.7 FL — HIGH (ref 7.4–10.4)
POTASSIUM SERPL-MCNC: 3.8 MMOL/L — SIGNIFICANT CHANGE UP (ref 3.5–5)
POTASSIUM SERPL-SCNC: 3.8 MMOL/L — SIGNIFICANT CHANGE UP (ref 3.5–5)
PROT SERPL-MCNC: 4.9 G/DL — LOW (ref 6–8)
RBC # BLD: 3.42 M/UL — LOW (ref 4.2–5.4)
RBC # FLD: 12.7 % — SIGNIFICANT CHANGE UP (ref 11.5–14.5)
SODIUM SERPL-SCNC: 142 MMOL/L — SIGNIFICANT CHANGE UP (ref 135–146)
WBC # BLD: 3.49 K/UL — LOW (ref 4.8–10.8)
WBC # FLD AUTO: 3.49 K/UL — LOW (ref 4.8–10.8)

## 2024-06-16 RX ADMIN — Medication 100 MILLIGRAM(S): at 11:31

## 2024-06-16 RX ADMIN — Medication 1 TABLET(S): at 11:31

## 2024-06-16 RX ADMIN — Medication 1 MILLIGRAM(S): at 11:31

## 2024-06-16 NOTE — DISCHARGE NOTE PROVIDER - HOSPITAL COURSE
HPI:  34F w/PMHx of Opiate dependence on Suboxone presents to ED BIBA 2/2 OD.  Patient reports smoked heroin last night and then went to a bar.  SHe had 2 drinks and then shortly after passed out.  EMS called at bar she was at and brought patient to ED.  Patient s/p 2 rounds of Narcan and more responsive  Patient reports that she smoked the heroin approx 2-3 prior to her episode.  No complaints of CP/SOB.  No abdominal or urinary complaints.  Patient reports had been clean since 12/23 with 1 episode where she smoked heroin in April.  No since.  Also admits to using Clonazepam, approx 4-5 0.5mg tabs over the past 3 days.  Reports she does not drink regularly.   (15 Darrius 2024 10:25)    toxic metabolic encephalopathy 2/2 suspected unintentional overdose  Polysubstance abuse  - mental status at baseline  - no events on telemetry  - patient is uninterested in rehab at this time  - continue suboxone, follow up with Dr. Givens and Dr. Naranjo outpatient    Medically stable for discharge per attending. HPI:  34F w/PMHx of Opiate dependence on Suboxone presents to ED BIBA 2/2 OD.  Patient reports smoked heroin last night and then went to a bar.  SHe had 2 drinks and then shortly after passed out.  EMS called at bar she was at and brought patient to ED.  Patient s/p 2 rounds of Narcan and more responsive  Patient reports that she smoked the heroin approx 2-3 prior to her episode.  No complaints of CP/SOB.  No abdominal or urinary complaints.  Patient reports had been clean since 12/23 with 1 episode where she smoked heroin in April.  No since.  Also admits to using Clonazepam, approx 4-5 0.5mg tabs over the past 3 days.  Reports she does not drink regularly.   (15 Darrius 2024 10:25)    toxic metabolic encephalopathy 2/2 suspected unintentional overdose  Polysubstance abuse  - mental status at baseline  - no events on telemetry  - patient is uninterested in rehab at this time  - continue suboxone, follow up with Dr. Givens and Dr. Naranjo outpatient    Medically stable for discharge.

## 2024-06-16 NOTE — DISCHARGE NOTE NURSING/CASE MANAGEMENT/SOCIAL WORK - PATIENT PORTAL LINK FT
You can access the FollowMyHealth Patient Portal offered by [facility] by registering at the following website: [email or web address]. By joining [employer]’s FollowMyHealth portal, you will also be able to view your health information using other applications (apps) compatible with our system.

## 2024-06-16 NOTE — DISCHARGE NOTE PROVIDER - NSDCCPCAREPLAN_GEN_ALL_CORE_FT
PRINCIPAL DISCHARGE DIAGNOSIS  Diagnosis: Drug overdose  Assessment and Plan of Treatment: please follow up with Dr. Naranjo and Dr. Givens

## 2024-06-16 NOTE — DISCHARGE NOTE PROVIDER - PROVIDER TOKENS
PROVIDER:[TOKEN:[[id number]],FOLLOWUP:[1-3 days]],PROVIDER:[TOKEN:[[id number]],FOLLOWUP:[1-3 days]]

## 2024-06-16 NOTE — DISCHARGE NOTE PROVIDER - CARE PROVIDER_API CALL
Soniya Munoz  Internal Medicine  [address] [place] [address]  Phone: [phone]  Fax: [phone]  Follow Up Time: 1-3 days    Fawad Givens  Psychiatry  [address] [place] [address]  Phone: [phone]  Fax: [phone]  Follow Up Time: 1-3 days

## 2024-06-16 NOTE — DISCHARGE NOTE NURSING/CASE MANAGEMENT/SOCIAL WORK - NSDCPEFALRISK_GEN_ALL_CORE
For information on Fall & Injury Prevention, visit: [email or web address] OR  [email or web address] OR  https://www.cdc.gov/steadi/patient.html

## 2024-06-21 LAB
CARBOXYTHC UR CFM-MCNC: 95 NG/ML — HIGH
FENTANYL UR CFM-MCNC: 1798 NG/ML — HIGH
NORBUPRENORPHINE UR-MCNC: >2500 NG/ML — SIGNIFICANT CHANGE UP
TOXICOLOGIST REVIEW: POSITIVE — SIGNIFICANT CHANGE UP
TOXICOLOGIST REVIEW: POSITIVE — SIGNIFICANT CHANGE UP

## 2024-06-24 RX ORDER — BUPRENORPHINE AND NALOXONE 2; .5 MG/1; MG/1
1 TABLET SUBLINGUAL
Refills: 0 | DISCHARGE

## 2024-10-20 ENCOUNTER — INPATIENT (INPATIENT)
Facility: HOSPITAL | Age: 34
LOS: 1 days | Discharge: LEFT BEFORE DISCHARGE | DRG: 53 | End: 2024-10-22
Attending: INTERNAL MEDICINE | Admitting: FAMILY MEDICINE
Payer: MEDICAID

## 2024-10-20 VITALS
HEART RATE: 97 BPM | DIASTOLIC BLOOD PRESSURE: 71 MMHG | OXYGEN SATURATION: 99 % | WEIGHT: 164.91 LBS | SYSTOLIC BLOOD PRESSURE: 117 MMHG | TEMPERATURE: 99 F | RESPIRATION RATE: 16 BRPM

## 2024-10-20 DIAGNOSIS — R56.9 UNSPECIFIED CONVULSIONS: ICD-10-CM

## 2024-10-20 LAB
ALBUMIN SERPL ELPH-MCNC: 4.2 G/DL — SIGNIFICANT CHANGE UP (ref 3.5–5.2)
ALP SERPL-CCNC: 71 U/L — SIGNIFICANT CHANGE UP (ref 30–115)
ALT FLD-CCNC: 16 U/L — SIGNIFICANT CHANGE UP (ref 0–41)
ANION GAP SERPL CALC-SCNC: 10 MMOL/L — SIGNIFICANT CHANGE UP (ref 7–14)
AST SERPL-CCNC: 13 U/L — SIGNIFICANT CHANGE UP (ref 0–41)
BASOPHILS # BLD AUTO: 0.04 K/UL — SIGNIFICANT CHANGE UP (ref 0–0.2)
BASOPHILS NFR BLD AUTO: 0.6 % — SIGNIFICANT CHANGE UP (ref 0–1)
BILIRUB SERPL-MCNC: 0.2 MG/DL — SIGNIFICANT CHANGE UP (ref 0.2–1.2)
BUN SERPL-MCNC: 14 MG/DL — SIGNIFICANT CHANGE UP (ref 10–20)
CALCIUM SERPL-MCNC: 8.9 MG/DL — SIGNIFICANT CHANGE UP (ref 8.4–10.5)
CHLORIDE SERPL-SCNC: 105 MMOL/L — SIGNIFICANT CHANGE UP (ref 98–110)
CO2 SERPL-SCNC: 26 MMOL/L — SIGNIFICANT CHANGE UP (ref 17–32)
CREAT SERPL-MCNC: 0.7 MG/DL — SIGNIFICANT CHANGE UP (ref 0.7–1.5)
EGFR: 115 ML/MIN/1.73M2 — SIGNIFICANT CHANGE UP
EOSINOPHIL # BLD AUTO: 0.05 K/UL — SIGNIFICANT CHANGE UP (ref 0–0.7)
EOSINOPHIL NFR BLD AUTO: 0.7 % — SIGNIFICANT CHANGE UP (ref 0–8)
GLUCOSE SERPL-MCNC: 129 MG/DL — HIGH (ref 70–99)
HCG SERPL QL: NEGATIVE — SIGNIFICANT CHANGE UP
HCT VFR BLD CALC: 38.5 % — SIGNIFICANT CHANGE UP (ref 37–47)
HGB BLD-MCNC: 12.6 G/DL — SIGNIFICANT CHANGE UP (ref 12–16)
IMM GRANULOCYTES NFR BLD AUTO: 0.1 % — SIGNIFICANT CHANGE UP (ref 0.1–0.3)
LYMPHOCYTES # BLD AUTO: 0.97 K/UL — LOW (ref 1.2–3.4)
LYMPHOCYTES # BLD AUTO: 14.3 % — LOW (ref 20.5–51.1)
MCHC RBC-ENTMCNC: 30.5 PG — SIGNIFICANT CHANGE UP (ref 27–31)
MCHC RBC-ENTMCNC: 32.7 G/DL — SIGNIFICANT CHANGE UP (ref 32–37)
MCV RBC AUTO: 93.2 FL — SIGNIFICANT CHANGE UP (ref 81–99)
MONOCYTES # BLD AUTO: 0.36 K/UL — SIGNIFICANT CHANGE UP (ref 0.1–0.6)
MONOCYTES NFR BLD AUTO: 5.3 % — SIGNIFICANT CHANGE UP (ref 1.7–9.3)
NEUTROPHILS # BLD AUTO: 5.35 K/UL — SIGNIFICANT CHANGE UP (ref 1.4–6.5)
NEUTROPHILS NFR BLD AUTO: 79 % — HIGH (ref 42.2–75.2)
NRBC # BLD: 0 /100 WBCS — SIGNIFICANT CHANGE UP (ref 0–0)
PLATELET # BLD AUTO: 230 K/UL — SIGNIFICANT CHANGE UP (ref 130–400)
PMV BLD: 10.1 FL — SIGNIFICANT CHANGE UP (ref 7.4–10.4)
POTASSIUM SERPL-MCNC: 4.5 MMOL/L — SIGNIFICANT CHANGE UP (ref 3.5–5)
POTASSIUM SERPL-SCNC: 4.5 MMOL/L — SIGNIFICANT CHANGE UP (ref 3.5–5)
PROT SERPL-MCNC: 6.3 G/DL — SIGNIFICANT CHANGE UP (ref 6–8)
RBC # BLD: 4.13 M/UL — LOW (ref 4.2–5.4)
RBC # FLD: 12.5 % — SIGNIFICANT CHANGE UP (ref 11.5–14.5)
SODIUM SERPL-SCNC: 141 MMOL/L — SIGNIFICANT CHANGE UP (ref 135–146)
WBC # BLD: 6.78 K/UL — SIGNIFICANT CHANGE UP (ref 4.8–10.8)
WBC # FLD AUTO: 6.78 K/UL — SIGNIFICANT CHANGE UP (ref 4.8–10.8)

## 2024-10-20 PROCEDURE — 80307 DRUG TEST PRSMV CHEM ANLYZR: CPT

## 2024-10-20 PROCEDURE — 85025 COMPLETE CBC W/AUTO DIFF WBC: CPT

## 2024-10-20 PROCEDURE — 36415 COLL VENOUS BLD VENIPUNCTURE: CPT

## 2024-10-20 PROCEDURE — 80354 DRUG SCREENING FENTANYL: CPT

## 2024-10-20 PROCEDURE — 95716 VEEG EA 12-26HR CONT MNTR: CPT

## 2024-10-20 PROCEDURE — 80053 COMPREHEN METABOLIC PANEL: CPT

## 2024-10-20 PROCEDURE — 70450 CT HEAD/BRAIN W/O DYE: CPT | Mod: 26,MC

## 2024-10-20 PROCEDURE — 99285 EMERGENCY DEPT VISIT HI MDM: CPT

## 2024-10-20 PROCEDURE — 99222 1ST HOSP IP/OBS MODERATE 55: CPT

## 2024-10-20 PROCEDURE — 95700 EEG CONT REC W/VID EEG TECH: CPT

## 2024-10-20 RX ORDER — GABAPENTIN 300 MG/1
300 CAPSULE ORAL EVERY 8 HOURS
Refills: 0 | Status: DISCONTINUED | OUTPATIENT
Start: 2024-10-20 | End: 2024-10-21

## 2024-10-20 RX ORDER — ACETAMINOPHEN 500 MG
975 TABLET ORAL ONCE
Refills: 0 | Status: COMPLETED | OUTPATIENT
Start: 2024-10-20 | End: 2024-10-20

## 2024-10-20 RX ORDER — MELATONIN 5 MG
5 TABLET ORAL AT BEDTIME
Refills: 0 | Status: DISCONTINUED | OUTPATIENT
Start: 2024-10-20 | End: 2024-10-22

## 2024-10-20 RX ORDER — ESCITALOPRAM 10 MG/1
1 TABLET, FILM COATED ORAL
Refills: 0 | DISCHARGE

## 2024-10-20 RX ORDER — ONDANSETRON HYDROCHLORIDE 2 MG/ML
4 INJECTION, SOLUTION INTRAMUSCULAR; INTRAVENOUS EVERY 8 HOURS
Refills: 0 | Status: DISCONTINUED | OUTPATIENT
Start: 2024-10-20 | End: 2024-10-22

## 2024-10-20 RX ORDER — ESCITALOPRAM 10 MG/1
10 TABLET, FILM COATED ORAL DAILY
Refills: 0 | Status: DISCONTINUED | OUTPATIENT
Start: 2024-10-20 | End: 2024-10-22

## 2024-10-20 RX ORDER — BUPRENORPHINE HYDROCHLORIDE, NALOXONE HYDROCHLORIDE 12; 3 MG/1; MG/1
2 FILM, SOLUBLE BUCCAL; SUBLINGUAL
Refills: 0 | Status: DISCONTINUED | OUTPATIENT
Start: 2024-10-20 | End: 2024-10-21

## 2024-10-20 RX ORDER — BUPRENORPHINE HYDROCHLORIDE, NALOXONE HYDROCHLORIDE 12; 3 MG/1; MG/1
2 FILM, SOLUBLE BUCCAL; SUBLINGUAL
Refills: 0 | Status: DISCONTINUED | OUTPATIENT
Start: 2024-10-20 | End: 2024-10-20

## 2024-10-20 RX ORDER — METOCLOPRAMIDE HCL 10 MG
10 TABLET ORAL ONCE
Refills: 0 | Status: COMPLETED | OUTPATIENT
Start: 2024-10-20 | End: 2024-10-20

## 2024-10-20 RX ORDER — ARIPIPRAZOLE 2 MG/1
1 TABLET ORAL
Refills: 0 | DISCHARGE

## 2024-10-20 RX ORDER — MAGNESIUM, ALUMINUM HYDROXIDE 200-200 MG
30 TABLET,CHEWABLE ORAL EVERY 4 HOURS
Refills: 0 | Status: DISCONTINUED | OUTPATIENT
Start: 2024-10-20 | End: 2024-10-22

## 2024-10-20 RX ORDER — LORAZEPAM 2 MG
2 TABLET ORAL DAILY
Refills: 0 | Status: DISCONTINUED | OUTPATIENT
Start: 2024-10-20 | End: 2024-10-21

## 2024-10-20 RX ORDER — ARIPIPRAZOLE 2 MG/1
5 TABLET ORAL DAILY
Refills: 0 | Status: DISCONTINUED | OUTPATIENT
Start: 2024-10-20 | End: 2024-10-22

## 2024-10-20 RX ORDER — BUPROPION HCL 150 MG
150 TABLET,SUSTAINED-RELEASE 12 HR ORAL DAILY
Refills: 0 | Status: DISCONTINUED | OUTPATIENT
Start: 2024-10-20 | End: 2024-10-21

## 2024-10-20 RX ORDER — ACETAMINOPHEN 500 MG
650 TABLET ORAL EVERY 6 HOURS
Refills: 0 | Status: DISCONTINUED | OUTPATIENT
Start: 2024-10-20 | End: 2024-10-22

## 2024-10-20 RX ADMIN — Medication 975 MILLIGRAM(S): at 17:35

## 2024-10-20 RX ADMIN — Medication 104 MILLIGRAM(S): at 17:35

## 2024-10-20 RX ADMIN — GABAPENTIN 300 MILLIGRAM(S): 300 CAPSULE ORAL at 21:44

## 2024-10-20 NOTE — ED PROVIDER NOTE - CLINICAL SUMMARY MEDICAL DECISION MAKING FREE TEXT BOX
34-year-old female with a past medical history of opioid use disorder on buprenorphine, presents with few days of having episodes where she "falls asleep while standing up.  "Shows me multiple videos of this where she appears to be standing still.  No convulsions.  Lasts anywhere from 1 to 5 minutes.  Her partner witnessed one of them and states that her face looks like she had fallen asleep.  Was nodding her head.  No injuries.  Also has had right temporal area headache over this time course intermittently.  No vomiting, neck pain, or any other focal neurological symptoms.  On exam nontoxic, vital signs noted  Gen: Alert, NAD  Skin: Warm, dry, intact  Head: NCAT, PERRL  ENT: Mucous membranes moist  Neck: Supple, no meningismus  CV: RRR, normal S1, S2, no m/r/g  Resp: Non labored respirations, Lungs CTA b/l, no wheezes, rales, rhonchi  Abdomen: Soft, nondistended, nontender  Extremities: Moving all extremities, no edema  Neuro: No focal neuro deficits, AAOx3, CNs II-XII intact, strength 5/5 in b/l UE/LE, sensation grossly intact, FTN intact, no dysmetria, normal gait, no ataxia, no drift  Psych: Cooperative  CT head with incidental findings.  Labs are reassuring.  I, Steven Andersen, discussed the case with Dr. Redding, neurology attending, who recommended admission for EEG given these episodes to evaluate for seizure-like activity

## 2024-10-20 NOTE — H&P ADULT - NEUROLOGICAL
normal/cranial nerves II-XII intact normal/cranial nerves II-XII intact/sensation intact/responds to pain/responds to verbal commands

## 2024-10-20 NOTE — ED ADULT TRIAGE NOTE - CHIEF COMPLAINT QUOTE
after overdosing 1 month ago, has not been feeling well, sleepy all the time, headache, has been sober since

## 2024-10-20 NOTE — ED PROVIDER NOTE - PHYSICAL EXAMINATION
As Follows:  CONST: Well appearing in NAD  EYES: PERRL, EOMI, Sclera and conjunctiva clear.   ENT: No nasal discharge. TM's clear B/L without drainage. Oropharynx normal appearing, no erythema or exudates. Uvula midline  CARD: No murmurs, rubs, or gallops; Normal rate and rhythm  RESP: BS Equal B/L, No wheezes, rhonchi or rales. No distress or accessory breathing  GI: Soft, non-tender, non-distended.  SKIN: Warm, dry, no acute rashes. MMM  NEURO: A&Ox3, No focal deficits. Strength and sensation intact. Steady gait. Finger to nose intact. No pronator drift.

## 2024-10-20 NOTE — ED PROVIDER NOTE - OBJECTIVE STATEMENT
Patient is a 34-year-old female with past history of opioid use disorder on Suboxone presents for evaluation of right-sided temporal headache.  She also admits to strange episodes of "sleeping "providing video requiring to be aroused and awoken by her significant other.  She denies any acute trauma, injury, fever, cough, sore throat, chills, nausea, vomiting, chest pain, shortness of breath, abdominal pain, urinary complaints.

## 2024-10-20 NOTE — H&P ADULT - HISTORY OF PRESENT ILLNESS
34-year-old female with past history of opioid use disorder on Suboxone presents for evaluation of right-sided temporal headache.  She also admits to strange episodes of "sleeping "providing video requiring to be aroused and awoken by her significant other.  She denies any acute trauma, injury, fever, cough, sore throat, chills, nausea, vomiting, chest pain, shortness of breath, abdominal pain, urinary complaints. 34-year-old female with past history of opioid use disorder on Suboxone, hx of OD in June 2024, depression, presents for evaluation of right-sided temporal headache.  She also admits to strange episodes of "sleeping "providing video requiring to be aroused and awoken by her significant other.  Pt's partner provided video footage from In-House cameras.   Pt stands there and stares.  No loss of balance, no loss of bowel or bladder.  No LOC.  She denies any acute trauma, injury, fever, cough, sore throat, chills, nausea, vomiting, chest pain, shortness of breath, abdominal pain, urinary complaints.      Home meds provided by pt.  Partner at bedside.

## 2024-10-20 NOTE — H&P ADULT - ASSESSMENT
34-year-old female with past history of opioid use disorder on Suboxone presents for evaluation of right-sided temporal headache.  She also admits to strange episodes of "sleeping "providing video requiring to be aroused and awoken by her significant other.  She denies any acute trauma, injury, fever, cough, sore throat, chills, nausea, vomiting, chest pain, shortness of breath, abdominal pain, urinary complaints.        # R/O Seizures  - Neuro consult  - MRI non cont  - EEG  - Ativan PRN for seizure lasting >2min      # Substance Dependence  - Urine drug screen  - On Suboxone.  34-year-old female with past history of opioid use disorder on Suboxone, hx of OD in June 2024, depression, presents for evaluation of right-sided temporal headache.  She also admits to strange episodes of "sleeping "providing video requiring to be aroused and awoken by her significant other.  Pt's partner provided video footage from In-House cameras.   Pt stands there and stares.  No loss of balance, no loss of bowel or bladder.  No LOC.  She denies any acute trauma, injury, fever, cough, sore throat, chills, nausea, vomiting, chest pain, shortness of breath, abdominal pain, urinary complaints.        # R/O Seizures  - Neuro consult  - MRI non cont  - EEG  - Ativan PRN for seizure lasting >2min      # Substance Dependence  - Urine drug screen  - On Suboxone.     # Depression  - c/w home meds 34-year-old female with past history of opioid use disorder on Suboxone, hx of OD in June 2024, depression, presents for evaluation of right-sided temporal headache.  She also admits to strange episodes of "sleeping "providing video requiring to be aroused and awoken by her significant other.  Pt's partner provided video footage from In-House cameras.   Pt stands there and stares.  No loss of balance, no loss of bowel or bladder.  No LOC.  She denies any acute trauma, injury, fever, cough, sore throat, chills, nausea, vomiting, chest pain, shortness of breath, abdominal pain, urinary complaints.        # R/O Seizures.  Headache  -Head CT scan noticed   - Neuro consult  - MRI non cont  - EEG  - Ativan PRN for seizure lasting >2min  -Seizure precautions       # Substance Dependence  - Urine drug screen  - On Suboxone.     # Depression  - c/w home meds    #Progress Note Handoff  Pending (specify):  as above   Family discussion:  plan of care was discussed with patient   in details.  all questions were answered.  seems to understand, and in agreement  Disposition:  home

## 2024-10-20 NOTE — PATIENT PROFILE ADULT - FUNCTIONAL ASSESSMENT - BASIC MOBILITY 6.
4-calculated by average/Not able to assess (calculate score using [facility] averaging method)

## 2024-10-20 NOTE — ED ADULT NURSE NOTE - IS THE PATIENT ABLE TO BE SCREENED?
Pt would like to know if it is ok for her to hold  Her Plavix  and Warfarin 3 days prior to colonoscopy on 2/21.
Returned call to patient. Informed her she may hold warfarin for three days as requested by GI. She was advised to contact the [facility] to update them as well. Informed patient Plavix is usually held for 5 days and advised to clarify hold requested by GI, but she may hold plavix. Informed patient she is to resume both medications post procedure. Patient verbalized understanding and has no further questions at this time.
Yes

## 2024-10-20 NOTE — H&P ADULT - MUSCULOSKELETAL
[facility] Hospitalist Internal Medicine Progress Note      Subjective:    KHANH reported overnight. This am, RN noted hard stool in rectal vault when giving suppository. Patient disimpacted by RN. Several small, hard balls of stool noted, moderate in quantity noted. Pt tolerating diet. She feels her hand strength is improved today with increased ability to move thumbs, but unable to hold things confidently still. Left shoulder pain is stable.     Review of Systems:   12 point ros negative except for in hpi    Dispo: medically stable for transfer to / AIR    [facility] Meds  Current Facility-Administered Medications   Medication Dose Route Frequency Provider Last Rate Last Admin    lidocaine (LIDOCARE) 4 % patch 1 patch  1 patch Transdermal Daily Al Copeland DO        insulin glargine (LANTUS) injection 15 Units  15 Units Subcutaneous Daily Paula Gonzalez MD   15 Units at 05/28/24 0837    guaiFENesin-DM (MUCINEX DM) 600-30 mg ER tablet 2 tablet  2 tablet Oral 2 times per day [place] grey APNP   2 tablet at 05/25/24 2132    loratadine (CLARITIN) tablet 10 mg  10 mg Oral QAM AC Paula Gonzalez MD   10 mg at 05/28/24 0657    insulin lispro (ADMELOG, HumaLOG) injection 3 Units  3 Units Subcutaneous TID  Paula Gonzalez MD   3 Units at 05/28/24 1042    insulin lispro (ADMELOG,HumaLOG) - Correction Dose   Subcutaneous 4x Daily AC & HS Shey Sevilla, JULIANNA   2 Units at 05/28/24 1041    heparin (porcine) injection 5,000 Units  5,000 Units Subcutaneous 3 times per day Ashlie Fry PA-C   5,000 Units at 05/28/24 1434    pregabalin (LYRICA) capsule 100 mg  100 mg Oral BID Corrina Rain CNP   100 mg at 05/28/24 0835    senna (SENOKOT) 17.2 mg  2 tablet Oral Nightly Noe Camarillo MD   17.2 mg at 05/27/24 2057    polyethylene glycol (MIRALAX) packet 17 g  17 g Oral BID Noe Camarillo MD   17 g at 05/28/24 0842    rosuvastatin (CRESTOR) tablet 20 mg  20 mg Oral Nightly Tabatha Multani, CNP   20 mg at 05/27/24  2125    pantoprazole (PROTONIX) EC tablet 40 mg  40 mg Oral QAM AC Tabatha Multani CNP   40 mg at 05/28/24 0407    docusate sodium (COLACE) capsule 200 mg  200 mg Oral BID Lissett House CNP   200 mg at 05/28/24 0835    sodium chloride 0.9 % injection 2 mL  2 mL Intracatheter 2 times per day Lissett House CNP   2 mL at 05/27/24 2102    Potassium Standard Replacement Protocol (Levels 3.5 and lower)   Does not apply See Admin Instructions Lissett House CNP        Magnesium Standard Replacement Protocol   Does not apply See Admin Instructions Lissett House CNP        Phosphorus Standard Replacement Protocol   Does not apply See Admin Instructions Lissett House CNP         Current Facility-Administered Medications   Medication Dose Route Frequency Provider Last Rate Last Admin     Current Facility-Administered Medications   Medication Dose Route Frequency Provider Last Rate Last Admin    acetaminophen (TYLENOL) tablet 650 mg  650 mg Oral Q4H PRN Shey Sevilla NP   650 mg at 05/23/24 0413    oxyCODONE-acetaminophen (PERCOCET) 5-325 MG tablet 2 tablet  2 tablet Oral Q4H PRN Paula Gonzalez MD   2 tablet at 05/28/24 1258    tiZANidine (ZANAFLEX) tablet 4 mg  4 mg Oral TID PRN Ashlie Fry PA-C   4 mg at 05/28/24 1038    hydrALAZINE (APRESOLINE) injection 10 mg  10 mg Intravenous Q4H PRN Corrina Rain CNP   10 mg at 05/21/24 0544    labetalol (NORMODYNE) injection 20 mg  20 mg Intravenous Q4H PRN Corrina Rain CNP   20 mg at 05/19/24 0550    bisacodyl (DULCOLAX) suppository 10 mg  10 mg Rectal Daily PRN Tabatha Multani CNP        sodium chloride 0.9 % flush bag 25 mL  25 mL Intravenous PRN Lissett House CNP        ondansetron (ZOFRAN ODT) disintegrating tablet 4 mg  4 mg Oral Q12H PRN Lissett House CNP        Or    ondansetron (ZOFRAN) injection 4 mg  4 mg Intravenous Q12H PRN Bartnicki, Lissett M, CNP        dextrose 50 % injection 25 g  25 g Intravenous PRN  Bartnicki, Lissett M, CNP        dextrose 50 % injection 12.5 g  12.5 g Intravenous PRN Lissett House CNP        glucagon (GLUCAGEN) injection 1 mg  1 mg Intramuscular PRN Lissett House CNP        dextrose (GLUTOSE) 40 % gel 15 g  15 g Oral PRN Lissett House CNP        dextrose (GLUTOSE) 40 % gel 30 g  30 g Oral PRN Lissett House CNP        sodium chloride 0.9 % flush bag 25 mL  25 mL Intravenous PRN Lissett House CNP            Last Recorded Vitals  Temp:  [97.7 °F (36.5 °C)-98.4 °F (36.9 °C)] 98.2 °F (36.8 °C)  Heart Rate:  [65-80] 80  Resp:  [16-18] 18  BP: (110-130)/(62-85) 110/70     SpO2 Readings from Last 3 Encounters:   05/28/24 97%   02/21/24 97%   01/09/24 96%        Intake/Output Summary (Last 24 hours) at 5/28/2024 1534  Last data filed at 5/28/2024 1500  Gross per 24 hour   Intake 600 ml   Output 1925 ml   Net -1325 ml        Physical Exam    Vital Signs:  Visit Vitals  /70   Pulse 80   Temp 98.2 °F (36.8 °C) (Oral)   Resp 18   Ht 5' 1\" (1.549 m)   Wt 88 kg (194 lb 0.1 oz)   SpO2 97%   BMI 36.66 kg/m²     General: alert, no acute distress, lying in bed and appears comfortable  Skin:  Warm, dry  Head:  Normocephalic-atraumatic. Left forehead abrasion  Left eye lid & orbit ecchymosis and edema  Neck:  C-collar in place, bruising noted on left shoulder  Eyes:  Normal conjunctivae and sclerae.  Extraocular movements intact.    ENT:   Mucous membranes are moist.   Cardiovascular:  Regular rate and rhythm. No edema  Respiratory:   Normal respiratory effort.  Clear to auscultation.  No wheezes, rales or rhonchi.  Gastrointestinal:  Soft. Nondistended. Nontender.  Normal bowel sounds.  Musculoskeletal:  5/5 BLE, 4/5 BUE at shoulder and elbow flexion,  bilateral thumbs move; poor hand  strength b/l  Neurologic:   Alert and oriented x 4. Normal speech.  Psychiatric:   Cooperative.  Appropriate mood and affect.  Normal judgment.    Labs     Recent Results (from the past  24 hour(s))   GLUCOSE, BEDSIDE - POINT OF CARE    Collection Time: 05/27/24  4:33 PM   Result Value Ref Range    GLUCOSE, BEDSIDE - POINT OF CARE 147 (H) 70 - 99 mg/dL   GLUCOSE, BEDSIDE - POINT OF CARE    Collection Time: 05/27/24  8:05 PM   Result Value Ref Range    GLUCOSE, BEDSIDE - POINT OF CARE 150 (H) 70 - 99 mg/dL   GLUCOSE, BEDSIDE - POINT OF CARE    Collection Time: 05/28/24  7:30 AM   Result Value Ref Range    GLUCOSE, BEDSIDE - POINT OF CARE 139 (H) 70 - 99 mg/dL   GLUCOSE, BEDSIDE - POINT OF CARE    Collection Time: 05/28/24 10:08 AM   Result Value Ref Range    GLUCOSE, BEDSIDE - POINT OF CARE 172 (H) 70 - 99 mg/dL     Microbiology Results       None            Imaging    FL INTRAOPERATIVE C ARM NO REPORT   Final Result      CTA CHEST ABDOMEN PELVIS   Final Result      1. No aortic dissection or other acute aortic abnormality. The tortuous   thoracic aorta is patent and nonaneurysmal.   2. Displaced extra-articular fracture of the left distal clavicle. No other   acute findings in the chest.   3. No acute traumatic abnormality in the abdomen or pelvis.   4. Status post cholecystectomy with extrahepatic biliary ductal dilatation,   more than expected for reservoir effect. Correlate with symptoms and   laboratory values and consider MRCP, if clinically warranted.   5. Distended urinary bladder containing nondependent gas. Correlate with   recent instrumentation and bladder outlet compromise.   6. Additional details as above.      Electronically Signed by: MUSTAPHA NOVAK MD    Signed on: 5/19/2024 12:02 AM    Workstation ID: [id number]      CT CERVICAL SPINE WO CONTRAST   Final Result      Acute hyperextension injury at C6-C7 with associated focal disruption of   the anterior longitudinal ligament and widening of the anterior disc space,   superimposed on severe spinal canal stenosis at C4-C7 secondary to disc   bulge, osteophytes, ligamentous hypertrophy, and grade 1 degenerative   anterolisthesis of  C4 on C5. Focal cord signal abnormality at C4-C6 likely   represents cord contusion and edema, possibly superimposed on chronic   myelomalacia.      Electronically Signed by: JOHNATHAN FOSTER M.D.    Signed on: 5/19/2024 1:03 AM    Workstation ID: [id number]      CT SHOULDER WO CONTRAST RIGHT   Final Result      No acute osseous abnormality.      Electronically Signed by: MUSTAPHA NOVAK MD    Signed on: 5/18/2024 11:47 PM    Workstation ID: [id number]      MRI CERVICAL SPINE WO CONTRAST   Final Result      Acute hyperextension injury at C6-C7 with associated focal disruption of   the anterior longitudinal ligament and widening of the anterior disc space,   superimposed on severe spinal canal stenosis at C4-C7 secondary to disc   bulge, osteophytes, ligamentous hypertrophy, and grade 1 degenerative   anterolisthesis of C4 on C5. Focal cord signal abnormality at C4-C6 likely   represents cord contusion and edema, possibly superimposed on chronic   myelomalacia.      Electronically Signed by: JOHNATHAN FOSTER M.D.    Signed on: 5/19/2024 1:03 AM    Workstation ID: [id number]      XR CLAVICLE LEFT   Final Result   1.   Minimally displaced distal clavicle fracture.            Electronically Signed by: RHEA ARMSTRONG MD    Signed on: 5/19/2024 9:55 AM    Workstation ID: [id number]      XR CHEST AP OR PA   Final Result   Impression:    Widened upper mediastinum. CTA chest is recommended to assess for acute   aortic pathology.      Findings communicated to Dr. Camarillo via telephone by Dr. Mustapha Novak at   8:24 p.m. on 5/18/2024.      Electronically Signed by: MUSTAPHA NOVAK MD    Signed on: 5/18/2024 8:25 PM    Workstation ID: [id number]          Assessment/Plan:    #Traumatic Hyperextension C-spine injury superimposed on cervical spinal stenosis 2/2 fall; POA  #Central cord syndrome; Spinal cord contusion/edema   #s/p C3-T2 posterior fusion and C4-7 laminectomies with Dr. Hussein on 5/20/2024  #B/l UE  paraparesis   -Hemovac x2 removed 5/24   -Maintain cervical collar at all times   -NSGY following  -follow up appt scheduled with [facility] on 6/2 at 11:30am for incision check and suture removal.   -plan for 6S/AIR     #Acute postoperative pain  #Post op constipation, fecal impaction; improved    -percocet and tizanidine prn on for pain   -continue lyrica  -aggressive bowel regimen   -s/p BM after RN disimpacted 5/28     #Acute postoperative blood loss anemia  -expected, monitor  -hbg 11.2 5/28      #Acute urinary retention, likely neurogenic  -Unable to void since fall, TOV unsuccessful  -hoffmann was placed      #Minimally Displaced extra-articular fracture of the left distal clavicle   -ortho following and nonoperative management recommended  -Nonweightbearing left upper extremity in sling   -f/u ortho outpt     #Hypomagnesemia / Hypophosphatemia  -replete     #UTI  -s/p macrobid x 3 days      #HTN  -Echo with EF of 54%, G1DD,  Wall thickness is moderately increased  -hold home clonidine 0.05 mg BID, lisinopril 5 mg daily, carvedilol 25 mg BID, furosemide 40 mg daily as BP wnl likely due to spinal cord contusion/central cord syndrome   -recommend compression stockings and abdominal binder to help prevent orthostatic hypotension post spinal cord injury   -monitor     #IDDM c/b neuropathy  -A1c of 6.9 on 5/22/2024  -hold oral home medication of ozempic, Tresiba 54U daily, Humalog 6U TID, jardiance  -Lantus 15U daily + 3U TID + SSI + Accuchecks  -continue home lyrica 100 mg BID     #HLD  -statin     #Obesity  Body mass index is 37.53 kg/m².  -lifestyle modification encouraged as affects all aspects of care  -hold oral home medication of ozempic     Dispo  -AIR once bed is available    DVT prophylaxis  heparin (porcine) - 5000 UNIT/ML     Primary Care Physician  Latrice Valdes MD    Code Status    Code Status: Full Resuscitation    Communication  RN, patient    More than 50 minutes were spent on this patient's care  today. This includes the following: Reviewed all vitals, medications, new orders, I/O, labs, micro, radiology, nurses notes, pertinent consultant notes which are reflected in assessment and plan.    DO FLACO [facility] Hospitalist  5/28/2024 3:34 PM           normal/ROM intact/normal gait/strength 5/5 bilateral upper extremities/strength 5/5 bilateral lower extremities

## 2024-10-20 NOTE — H&P ADULT - NSHPPHYSICALEXAM_GEN_ALL_CORE
Vital Signs Last 24 Hrs  T(C): 37 (20 Oct 2024 20:10), Max: 37.1 (20 Oct 2024 15:55)  T(F): 98.6 (20 Oct 2024 20:10), Max: 98.7 (20 Oct 2024 15:55)  HR: 92 (20 Oct 2024 20:10) (92 - 97)  BP: 117/73 (20 Oct 2024 20:10) (117/71 - 117/73)  BP(mean): --  RR: 18 (20 Oct 2024 20:10) (16 - 18)  SpO2: 99% (20 Oct 2024 20:10) (99% - 99%)    Parameters below as of 20 Oct 2024 20:10  Patient On (Oxygen Delivery Method): room air

## 2024-10-21 LAB
ALBUMIN SERPL ELPH-MCNC: 3.9 G/DL — SIGNIFICANT CHANGE UP (ref 3.5–5.2)
ALP SERPL-CCNC: 66 U/L — SIGNIFICANT CHANGE UP (ref 30–115)
ALT FLD-CCNC: 13 U/L — SIGNIFICANT CHANGE UP (ref 0–41)
AMPHET UR-MCNC: NEGATIVE — SIGNIFICANT CHANGE UP
ANION GAP SERPL CALC-SCNC: 8 MMOL/L — SIGNIFICANT CHANGE UP (ref 7–14)
AST SERPL-CCNC: 10 U/L — SIGNIFICANT CHANGE UP (ref 0–41)
BARBITURATES UR SCN-MCNC: NEGATIVE — SIGNIFICANT CHANGE UP
BASOPHILS # BLD AUTO: 0.06 K/UL — SIGNIFICANT CHANGE UP (ref 0–0.2)
BASOPHILS NFR BLD AUTO: 1.3 % — HIGH (ref 0–1)
BENZODIAZ UR-MCNC: NEGATIVE — SIGNIFICANT CHANGE UP
BILIRUB SERPL-MCNC: 0.3 MG/DL — SIGNIFICANT CHANGE UP (ref 0.2–1.2)
BUN SERPL-MCNC: 12 MG/DL — SIGNIFICANT CHANGE UP (ref 10–20)
CALCIUM SERPL-MCNC: 8.7 MG/DL — SIGNIFICANT CHANGE UP (ref 8.4–10.5)
CHLORIDE SERPL-SCNC: 106 MMOL/L — SIGNIFICANT CHANGE UP (ref 98–110)
CO2 SERPL-SCNC: 27 MMOL/L — SIGNIFICANT CHANGE UP (ref 17–32)
COCAINE METAB.OTHER UR-MCNC: NEGATIVE — SIGNIFICANT CHANGE UP
CREAT SERPL-MCNC: 0.7 MG/DL — SIGNIFICANT CHANGE UP (ref 0.7–1.5)
DRUG SCREEN 1, URINE RESULT: SIGNIFICANT CHANGE UP
EGFR: 115 ML/MIN/1.73M2 — SIGNIFICANT CHANGE UP
EOSINOPHIL # BLD AUTO: 0.14 K/UL — SIGNIFICANT CHANGE UP (ref 0–0.7)
EOSINOPHIL NFR BLD AUTO: 3 % — SIGNIFICANT CHANGE UP (ref 0–8)
FENTANYL UR QL: NEGATIVE — SIGNIFICANT CHANGE UP
GLUCOSE SERPL-MCNC: 126 MG/DL — HIGH (ref 70–99)
HCT VFR BLD CALC: 38 % — SIGNIFICANT CHANGE UP (ref 37–47)
HGB BLD-MCNC: 12.4 G/DL — SIGNIFICANT CHANGE UP (ref 12–16)
IMM GRANULOCYTES NFR BLD AUTO: 0.2 % — SIGNIFICANT CHANGE UP (ref 0.1–0.3)
LYMPHOCYTES # BLD AUTO: 1.95 K/UL — SIGNIFICANT CHANGE UP (ref 1.2–3.4)
LYMPHOCYTES # BLD AUTO: 42.2 % — SIGNIFICANT CHANGE UP (ref 20.5–51.1)
MCHC RBC-ENTMCNC: 30.3 PG — SIGNIFICANT CHANGE UP (ref 27–31)
MCHC RBC-ENTMCNC: 32.6 G/DL — SIGNIFICANT CHANGE UP (ref 32–37)
MCV RBC AUTO: 92.9 FL — SIGNIFICANT CHANGE UP (ref 81–99)
METHADONE UR-MCNC: NEGATIVE — SIGNIFICANT CHANGE UP
MONOCYTES # BLD AUTO: 0.35 K/UL — SIGNIFICANT CHANGE UP (ref 0.1–0.6)
MONOCYTES NFR BLD AUTO: 7.6 % — SIGNIFICANT CHANGE UP (ref 1.7–9.3)
NEUTROPHILS # BLD AUTO: 2.11 K/UL — SIGNIFICANT CHANGE UP (ref 1.4–6.5)
NEUTROPHILS NFR BLD AUTO: 45.7 % — SIGNIFICANT CHANGE UP (ref 42.2–75.2)
NRBC # BLD: 0 /100 WBCS — SIGNIFICANT CHANGE UP (ref 0–0)
OPIATES UR-MCNC: NEGATIVE — SIGNIFICANT CHANGE UP
OXYCODONE UR-MCNC: NEGATIVE — SIGNIFICANT CHANGE UP
PCP UR-MCNC: NEGATIVE — SIGNIFICANT CHANGE UP
PLATELET # BLD AUTO: 224 K/UL — SIGNIFICANT CHANGE UP (ref 130–400)
PMV BLD: 10.1 FL — SIGNIFICANT CHANGE UP (ref 7.4–10.4)
POTASSIUM SERPL-MCNC: 3.4 MMOL/L — LOW (ref 3.5–5)
POTASSIUM SERPL-SCNC: 3.4 MMOL/L — LOW (ref 3.5–5)
PROPOXYPHENE QUALITATIVE URINE RESULT: NEGATIVE — SIGNIFICANT CHANGE UP
PROT SERPL-MCNC: 5.8 G/DL — LOW (ref 6–8)
RBC # BLD: 4.09 M/UL — LOW (ref 4.2–5.4)
RBC # FLD: 12.4 % — SIGNIFICANT CHANGE UP (ref 11.5–14.5)
SODIUM SERPL-SCNC: 141 MMOL/L — SIGNIFICANT CHANGE UP (ref 135–146)
THC UR QL: NEGATIVE — SIGNIFICANT CHANGE UP
WBC # BLD: 4.62 K/UL — LOW (ref 4.8–10.8)
WBC # FLD AUTO: 4.62 K/UL — LOW (ref 4.8–10.8)

## 2024-10-21 PROCEDURE — 99222 1ST HOSP IP/OBS MODERATE 55: CPT

## 2024-10-21 PROCEDURE — 99232 SBSQ HOSP IP/OBS MODERATE 35: CPT

## 2024-10-21 RX ORDER — GABAPENTIN 300 MG/1
2 CAPSULE ORAL
Refills: 0 | DISCHARGE

## 2024-10-21 RX ORDER — LORAZEPAM 2 MG
2 TABLET ORAL THREE TIMES A DAY
Refills: 0 | Status: DISCONTINUED | OUTPATIENT
Start: 2024-10-21 | End: 2024-10-22

## 2024-10-21 RX ORDER — BUPRENORPHINE HYDROCHLORIDE, NALOXONE HYDROCHLORIDE 12; 3 MG/1; MG/1
1 FILM, SOLUBLE BUCCAL; SUBLINGUAL ONCE
Refills: 0 | Status: DISCONTINUED | OUTPATIENT
Start: 2024-10-21 | End: 2024-10-21

## 2024-10-21 RX ORDER — BUPROPION HCL 150 MG
1 TABLET,SUSTAINED-RELEASE 12 HR ORAL
Refills: 0 | DISCHARGE

## 2024-10-21 RX ORDER — GABAPENTIN 300 MG/1
600 CAPSULE ORAL THREE TIMES A DAY
Refills: 0 | Status: DISCONTINUED | OUTPATIENT
Start: 2024-10-21 | End: 2024-10-22

## 2024-10-21 RX ORDER — LORAZEPAM 2 MG
1 TABLET ORAL EVERY 4 HOURS
Refills: 0 | Status: DISCONTINUED | OUTPATIENT
Start: 2024-10-21 | End: 2024-10-21

## 2024-10-21 RX ORDER — BUPRENORPHINE HYDROCHLORIDE, NALOXONE HYDROCHLORIDE 12; 3 MG/1; MG/1
1 FILM, SOLUBLE BUCCAL; SUBLINGUAL
Refills: 0 | Status: DISCONTINUED | OUTPATIENT
Start: 2024-10-21 | End: 2024-10-22

## 2024-10-21 RX ADMIN — Medication 85 MILLILITER(S): at 23:13

## 2024-10-21 RX ADMIN — GABAPENTIN 600 MILLIGRAM(S): 300 CAPSULE ORAL at 13:14

## 2024-10-21 RX ADMIN — GABAPENTIN 600 MILLIGRAM(S): 300 CAPSULE ORAL at 06:31

## 2024-10-21 RX ADMIN — Medication 85 MILLILITER(S): at 11:33

## 2024-10-21 RX ADMIN — Medication 650 MILLIGRAM(S): at 23:20

## 2024-10-21 RX ADMIN — ARIPIPRAZOLE 5 MILLIGRAM(S): 2 TABLET ORAL at 11:34

## 2024-10-21 RX ADMIN — BUPRENORPHINE HYDROCHLORIDE, NALOXONE HYDROCHLORIDE 1 TABLET(S): 12; 3 FILM, SOLUBLE BUCCAL; SUBLINGUAL at 08:07

## 2024-10-21 RX ADMIN — ESCITALOPRAM 10 MILLIGRAM(S): 10 TABLET, FILM COATED ORAL at 11:34

## 2024-10-21 RX ADMIN — GABAPENTIN 600 MILLIGRAM(S): 300 CAPSULE ORAL at 21:05

## 2024-10-21 RX ADMIN — BUPRENORPHINE HYDROCHLORIDE, NALOXONE HYDROCHLORIDE 1 TABLET(S): 12; 3 FILM, SOLUBLE BUCCAL; SUBLINGUAL at 18:11

## 2024-10-21 NOTE — PROGRESS NOTE ADULT - ASSESSMENT
# R/O Seizures.  Headache  # Substance Dependence  # Depression    a 34-year-old female with past history of opioid use disorder on Suboxone, hx of OD in June 2024, depression, presents for evaluation of right-sided temporal headache.  She also admits to strange episodes of "sleeping "providing video requiring to be aroused and awoken by her significant other.  Pt's partner provided video footage from In-House cameras.   Pt stands there and stares.  No loss of balance, no loss of bowel or bladder.  No LOC.  She denies any acute trauma, injury, fever, cough, sore throat, chills, nausea, vomiting, chest pain, shortness of breath, abdominal pain, urinary complaints.   - CT  HeadNo definite evidence of acute intracranial hemorrhage.Nonspecific focus of hypoattenuation in the left inferior occipital lobe possible prominent vascular space or prior lacunar infarct however   given patient's age again remains nonspecific.   - Neuro consult recs appre.   - vEEG     plan:  - Ativan PRN for seizure lasting >2min  - Urine drug screen  - On Suboxone  - Continue Iv fluids as needed  - consulted Neurology Rec's appr.   - PT/rehab Rec's appre  - tylenol and pain management   - continue current medication, antihypertensive (with holding parameters),  - monitor vitals closely, daily am labs  - precaution (fall/aspiration/seizure)      DVT prophylaxis: low risk  GI prophylaxis: Protonix   diet: HH diet   code status: full code  Goals of care/disposition: Home     #Progress Note Handoff  Pending (specify):  v EEG and neurology clearance   Disposition: home

## 2024-10-21 NOTE — CONSULT NOTE ADULT - ASSESSMENT
34y F PMH opioid use disorder, depression, lumbar radiculopathy presented to ED for right temporal headache, as well as significant lethargy and episodes of "falling asleep while standing" over the last two days. CTH reports non specific focus of hypoattenuation in L inferior occipital lobe. On exam, patient with hyperreflexia in the LEs. Current symptoms suspicious for seizure activity v sleep disorder.  34y F PMH opioid use disorder, depression, lumbar radiculopathy presented to ED for right temporal headache, as well as significant lethargy and episodes of "falling asleep while standing" over the last two days. CTH reports non specific focus of hypoattenuation in L inferior occipital lobe. On exam, patient with hyperreflexia in the LEs. Current symptoms suspicious for seizure activity v sleep disorder. vEEG pending.  34y F PMH opioid use disorder, depression, lumbar radiculopathy presented to ED for right temporal headache, as well as significant lethargy and episodes of "falling asleep while standing" over the last two days. CTH reports non specific focus of hypoattenuation in L inferior occipital lobe. On exam, patient with hyperreflexia in the LEs. Current symptoms suspicious for seizure activity v sleep disorder. vEEG pending.     Recommendations  - f/u vEEG  - If will not delay discharge, MRI Brain no contrast can be done inpatient. Otherwise, can be done outpatient   - Medical management per primary team    Discussed with attending Dr Pitts

## 2024-10-21 NOTE — PROGRESS NOTE ADULT - SUBJECTIVE AND OBJECTIVE BOX
CHARLENE BALDWIN 34y Female  MRN#: [id number]   Hospital Day: 1d    SUBJECTIVE  Patient is a 34y old Female who presents with a chief complaint of Currently admitted to medicine with the primary diagnosis of Partial seizure with impaired consciousness      INTERVAL HPI AND OVERNIGHT EVENTS:  Patient was examined and seen at bedside. This morning she is resting comfortably in bed and reports no issues or overnight events.  poor appetite, denies chest pain , sob, n/v/d/c, hematuria or bloody stool.     REVIEW OF SYMPTOMS:  10 point ROS negative except as above.    OBJECTIVE  PAST MEDICAL & SURGICAL HISTORY  Substance abuse    No significant past surgical history      ALLERGIES:  No Known Allergies    MEDICATIONS:  STANDING MEDICATIONS  ARIPiprazole 5 milliGRAM(s) Oral daily  buprenorphine 8 mG/naloxone 2 mG SL  Tablet 1 Tablet(s) SubLingual two times a day  escitalopram 10 milliGRAM(s) Oral daily  gabapentin 600 milliGRAM(s) Oral three times a day  sodium chloride 0.9% 1000 milliLiter(s) IV Continuous     PRN MEDICATIONS  acetaminophen     Tablet .. 650 milliGRAM(s) Oral every 6 hours PRN  aluminum hydroxide/magnesium hydroxide/simethicone Suspension 30 milliLiter(s) Oral every 4 hours PRN  LORazepam   Injectable 2 milliGRAM(s) IV Push three times a day PRN  melatonin 5 milliGRAM(s) Oral at bedtime PRN  ondansetron Injectable 4 milliGRAM(s) IV Push every 8 hours PRN      VITAL SIGNS: Last 24 Hours  T(C): 36.9 (21 Oct 2024 05:24), Max: 37.1 (20 Oct 2024 15:55)  T(F): 98.4 (21 Oct 2024 05:24), Max: 98.7 (20 Oct 2024 15:55)  HR: 75 (21 Oct 2024 05:24) (75 - 97)  BP: 108/64 (21 Oct 2024 05:24) (108/64 - 117/73)  BP(mean): --  RR: 18 (21 Oct 2024 05:24) (16 - 18)  SpO2: 96% (21 Oct 2024 05:24) (96% - 99%)    PHYSICAL EXAM:  GENERAL: NAD, well-groomed, well-developed  HEENT: NC/AT,   NECK: Supple, No JVD  CHEST/LUNG: Clear to auscultation bilaterally; No rales, rhonchi, wheezing  HEART: Regular rate and rhythm; No murmurs, rubs, or gallops  ABDOMEN: Soft, Nontender, Nondistended; Bowel sounds present  EXTREMITIES: no edema, left Leg cast  NEURO:  No Focal deficits, sensory and motor intact  SKIN: No rashes or lesions    LABS:                        12.4   4.62  )-----------( 224      ( 21 Oct 2024 06:08 )             38.0     10-21    141  |  106  |  12  ----------------------------<  126[H]  3.4[L]   |  27  |  0.7    Ca    8.7      21 Oct 2024 06:08    TPro  5.8[L]  /  Alb  3.9  /  TBili  0.3  /  DBili  x   /  AST  10  /  ALT  13  /  AlkPhos  66  10-21      Urinalysis Basic - ( 21 Oct 2024 06:08 )    Color: x / Appearance: x / SG: x / pH: x  Gluc: 126 mg/dL / Ketone: x  / Bili: x / Urobili: x   Blood: x / Protein: x / Nitrite: x   Leuk Esterase: x / RBC: x / WBC x   Sq Epi: x / Non Sq Epi: x / Bacteria: x      RADIOLOGY:    < from: CT Head No Cont (10.20.24 @ 17:13) >  IMPRESSION:  1.  No definite evidence of acute intracranial hemorrhage.  2.  Nonspecific focus of hypoattenuation in the left inferior occipital   lobe possible prominent vascular space or prior lacunar infarct however   given patient's age again remains nonspecific. If symptoms persist   consider outpatient nonemergent brain MRI for further evaluation.    < end of copied text >     CHARLENE BALDWIN 34y Female  MRN#: [id number]   Hospital Day: 1d    SUBJECTIVE  Patient is a 34y old Female who presents with a chief complaint of Currently admitted to medicine with the primary diagnosis of Partial seizure with impaired consciousness      INTERVAL HPI AND OVERNIGHT EVENTS:  Patient was examined and seen at bedside. This morning she is resting comfortably in bed and reports no issues or overnight events.  poor appetite, denies chest pain , sob, n/v/d/c, hematuria or bloody stool.     REVIEW OF SYMPTOMS:  10 point ROS negative except as above.    OBJECTIVE  PAST MEDICAL & SURGICAL HISTORY  Substance abuse    No significant past surgical history      ALLERGIES:  No Known Allergies    MEDICATIONS:  STANDING MEDICATIONS  ARIPiprazole 5 milliGRAM(s) Oral daily  buprenorphine 8 mG/naloxone 2 mG SL  Tablet 1 Tablet(s) SubLingual two times a day  escitalopram 10 milliGRAM(s) Oral daily  gabapentin 600 milliGRAM(s) Oral three times a day  sodium chloride 0.9% 1000 milliLiter(s) IV Continuous     PRN MEDICATIONS  acetaminophen     Tablet .. 650 milliGRAM(s) Oral every 6 hours PRN  aluminum hydroxide/magnesium hydroxide/simethicone Suspension 30 milliLiter(s) Oral every 4 hours PRN  LORazepam   Injectable 2 milliGRAM(s) IV Push three times a day PRN  melatonin 5 milliGRAM(s) Oral at bedtime PRN  ondansetron Injectable 4 milliGRAM(s) IV Push every 8 hours PRN      VITAL SIGNS: Last 24 Hours  T(C): 36.9 (21 Oct 2024 05:24), Max: 37.1 (20 Oct 2024 15:55)  T(F): 98.4 (21 Oct 2024 05:24), Max: 98.7 (20 Oct 2024 15:55)  HR: 75 (21 Oct 2024 05:24) (75 - 97)  BP: 108/64 (21 Oct 2024 05:24) (108/64 - 117/73)  BP(mean): --  RR: 18 (21 Oct 2024 05:24) (16 - 18)  SpO2: 96% (21 Oct 2024 05:24) (96% - 99%)    PHYSICAL EXAM:  GENERAL: NAD, well-groomed, well-developed  HEENT: NC/AT,   NECK: Supple, No JVD  CHEST/LUNG: Clear to auscultation bilaterally; No rales, rhonchi, wheezing  HEART: Regular rate and rhythm; No murmurs, rubs, or gallops  ABDOMEN: Soft, Nontender, Nondistended; Bowel sounds present  EXTREMITIES: no edema,   NEURO:  No Focal deficits, sensory and motor intact  SKIN: No rashes or lesions    LABS:                        12.4   4.62  )-----------( 224      ( 21 Oct 2024 06:08 )             38.0     10-21    141  |  106  |  12  ----------------------------<  126[H]  3.4[L]   |  27  |  0.7    Ca    8.7      21 Oct 2024 06:08    TPro  5.8[L]  /  Alb  3.9  /  TBili  0.3  /  DBili  x   /  AST  10  /  ALT  13  /  AlkPhos  66  10-21      Urinalysis Basic - ( 21 Oct 2024 06:08 )    Color: x / Appearance: x / SG: x / pH: x  Gluc: 126 mg/dL / Ketone: x  / Bili: x / Urobili: x   Blood: x / Protein: x / Nitrite: x   Leuk Esterase: x / RBC: x / WBC x   Sq Epi: x / Non Sq Epi: x / Bacteria: x      RADIOLOGY:    < from: CT Head No Cont (10.20.24 @ 17:13) >  IMPRESSION:  1.  No definite evidence of acute intracranial hemorrhage.  2.  Nonspecific focus of hypoattenuation in the left inferior occipital   lobe possible prominent vascular space or prior lacunar infarct however   given patient's age again remains nonspecific. If symptoms persist   consider outpatient nonemergent brain MRI for further evaluation.    < end of copied text >

## 2024-10-21 NOTE — CONSULT NOTE ADULT - NS ATTEND AMEND GEN_ALL_CORE FT
Patient also reports that she suddenly stopped taking her Gabapentin 2 days prior to symptom onset and has just started taking Bupropion. Patients significant other showed videos of events: sudden onset of sleep-like state while performing household activities, no shaking observed or incontinence, patient does not fall to floor; denies any prodrome apart from feeling tired. Only family hx pertinent for great grandmother with epilepsy. 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 In addition to above recommendations, would obtain Utox and hold wellbutrin for now, c/w rest of home regimen.

## 2024-10-21 NOTE — CONSULT NOTE ADULT - SUBJECTIVE AND OBJECTIVE BOX
NEUROLOGY CONSULT    HPI:  34-year-old female with past history of opioid use disorder on Suboxone, hx of OD in June 2024, depression, presents for evaluation of right-sided temporal headache.  She also admits to strange episodes of "sleeping "providing video requiring to be aroused and awoken by her significant other.  Pt's partner provided video footage from In-House cameras.   Pt stands there and stares.  No loss of balance, no loss of bowel or bladder.  No LOC.  She denies any acute trauma, injury, fever, cough, sore throat, chills, nausea, vomiting, chest pain, shortness of breath, abdominal pain, urinary complaints.    Home meds provided by pt.  Partner at bedside. (20 Oct 2024 19:31)    Neuro consulted. Patient seen at bedside. She notes that for the last two days she has been significantly "overtired". The first day of symptoms, a few times per hour she would also have episodes of almost falling asleep while standing. She would still be able to comprehend and answer her partner during these episodes, but she felt "slowed". Her legs would become weak and she would feel as if she were about to fall. No tongue biting/bladder incontinence. In addition, she notes a right temporal headache, that has been intermitternt, not relieved with OTC Rx. No history of headaches in the past. Over the last few days she admits to lack of appetite, nausea, and lightheadedness. No dizziness/visual changes. No paresthesias. No recent head trauma/falls. No change in routine from two days ago. She has been sleeping well at night. She is not waking up with blood in her mouth. She has never had similar episodes before. No recent illnesses/travel. No FHx of seizures, sleep disorders.     She has a history of lumbar radiculopathy.      MEDICATIONS  Home Medications:  Abilify 5 mg oral tablet: 1 tab(s) orally once a day (20 Oct 2024 20:33)  gabapentin 300 mg oral tablet: 2 tab(s) orally 3 times a day (21 Oct 2024 00:10)  Lexapro 10 mg oral tablet: 1 tab(s) orally once a day (20 Oct 2024 20:32)  Suboxone 8 mg-2 mg sublingual film: 1 film(s) sublingually 2 times a day (24 Jun 2024 10:31)  Suboxone 8 mg-2 mg sublingual tablet: 2 tab(s) sublingually 2 times a day (20 Oct 2024 20:33)    MEDICATIONS  (STANDING):  ARIPiprazole 5 milliGRAM(s) Oral daily  buprenorphine 8 mG/naloxone 2 mG SL  Tablet 1 Tablet(s) SubLingual two times a day  buprenorphine 8 mG/naloxone 2 mG SL  Tablet 1 Tablet(s) SubLingual once  escitalopram 10 milliGRAM(s) Oral daily  gabapentin 600 milliGRAM(s) Oral three times a day    MEDICATIONS  (PRN):  acetaminophen     Tablet .. 650 milliGRAM(s) Oral every 6 hours PRN Temp greater or equal to 38C (100.4F), Mild Pain (1 - 3)  aluminum hydroxide/magnesium hydroxide/simethicone Suspension 30 milliLiter(s) Oral every 4 hours PRN Dyspepsia  LORazepam   Injectable 1 milliGRAM(s) IV Push every 4 hours PRN seizure  melatonin 5 milliGRAM(s) Oral at bedtime PRN Insomnia  ondansetron Injectable 4 milliGRAM(s) IV Push every 8 hours PRN Nausea and/or Vomiting      FAMILY HISTORY:    SOCIAL HISTORY: past heroin use- overdose in January 2024; denies use since. 1/2 ppd cigarettes x 20years. No alcohol. No other illicit drugs     Allergies  No Known Allergies          GEN: NAD, pleasant, cooperative    NEURO:   MENTAL STATUS: AAOx3  LANG/SPEECH: Fluent, intact naming, repetition & comprehension  CRANIAL NERVES:  II: Pupils equal round and reactive, normal visual fields  III, IV, VI: EOM intact, no gaze preference or deviation  V: normal  VII: no facial asymmetry  VIII: normal hearing to speech  MOTOR: 5/5 in both upper and lower extremities  REFLEXES: 3+ bilateral patella/ Achilles; 2+ bilateral biceps. Muted babinski   SENSORY: decreased to light touch L L4-L5  COORD: Normal finger to nose and heel to shin, no tremor, no dysmetria      LABS:                        12.4   4.62  )-----------( 224      ( 21 Oct 2024 06:08 )             38.0     10-21    141  |  106  |  12  ----------------------------<  126[H]  3.4[L]   |  27  |  0.7    Ca    8.7      21 Oct 2024 06:08    TPro  5.8[L]  /  Alb  3.9  /  TBili  0.3  /  DBili  x   /  AST  10  /  ALT  13  /  AlkPhos  66  10-21    Hemoglobin A1C:   Vitamin B12     CAPILLARY BLOOD GLUCOSE          Urinalysis Basic - ( 21 Oct 2024 06:08 )    Color: x / Appearance: x / SG: x / pH: x  Gluc: 126 mg/dL / Ketone: x  / Bili: x / Urobili: x   Blood: x / Protein: x / Nitrite: x   Leuk Esterase: x / RBC: x / WBC x   Sq Epi: x / Non Sq Epi: x / Bacteria: x            Microbiology:      RADIOLOGY, EKG AND ADDITIONAL TESTS: Reviewed.           NEUROLOGY CONSULT    HPI:  34-year-old female with past history of opioid use disorder on Suboxone, hx of OD in June 2024, depression, presents for evaluation of right-sided temporal headache.  She also admits to strange episodes of "sleeping "providing video requiring to be aroused and awoken by her significant other.  Pt's partner provided video footage from In-House cameras.   Pt stands there and stares.  No loss of balance, no loss of bowel or bladder.  No LOC.  She denies any acute trauma, injury, fever, cough, sore throat, chills, nausea, vomiting, chest pain, shortness of breath, abdominal pain, urinary complaints.    Home meds provided by pt.  Partner at bedside. (20 Oct 2024 19:31)    Neuro consulted. Patient seen at bedside. She notes that for the last two days she has been significantly "overtired". The first day of symptoms, a few times per hour she would also have episodes of almost falling asleep while standing. She would still be able to comprehend and answer her partner during these episodes, but she felt "slowed". Her legs would become weak and she would feel as if she were about to fall. No tongue biting/bladder incontinence. In addition, she notes a right temporal headache, that has been intermitternt, not relieved with OTC Rx. No history of headaches in the past. Over the last few days she admits to lack of appetite, nausea, and lightheadedness. No dizziness/visual changes. No paresthesias. No recent head trauma/falls. No change in routine from two days ago. She has been sleeping well at night. She is not waking up with blood in her mouth. She has never had similar episodes before. No recent illnesses/travel. No FHx of seizures, sleep disorders.     She has a history of lumbar radiculopathy.      MEDICATIONS  Home Medications:  Abilify 5 mg oral tablet: 1 tab(s) orally once a day (20 Oct 2024 20:33)  gabapentin 300 mg oral tablet: 2 tab(s) orally 3 times a day (21 Oct 2024 00:10)  Lexapro 10 mg oral tablet: 1 tab(s) orally once a day (20 Oct 2024 20:32)  Suboxone 8 mg-2 mg sublingual film: 1 film(s) sublingually 2 times a day (24 Jun 2024 10:31)  Suboxone 8 mg-2 mg sublingual tablet: 2 tab(s) sublingually 2 times a day (20 Oct 2024 20:33)    MEDICATIONS  (STANDING):  ARIPiprazole 5 milliGRAM(s) Oral daily  buprenorphine 8 mG/naloxone 2 mG SL  Tablet 1 Tablet(s) SubLingual two times a day  buprenorphine 8 mG/naloxone 2 mG SL  Tablet 1 Tablet(s) SubLingual once  escitalopram 10 milliGRAM(s) Oral daily  gabapentin 600 milliGRAM(s) Oral three times a day    MEDICATIONS  (PRN):  acetaminophen     Tablet .. 650 milliGRAM(s) Oral every 6 hours PRN Temp greater or equal to 38C (100.4F), Mild Pain (1 - 3)  aluminum hydroxide/magnesium hydroxide/simethicone Suspension 30 milliLiter(s) Oral every 4 hours PRN Dyspepsia  LORazepam   Injectable 1 milliGRAM(s) IV Push every 4 hours PRN seizure  melatonin 5 milliGRAM(s) Oral at bedtime PRN Insomnia  ondansetron Injectable 4 milliGRAM(s) IV Push every 8 hours PRN Nausea and/or Vomiting      FAMILY HISTORY:    SOCIAL HISTORY: past heroin, oxycodone use- overdose in January 2024; denies use since. 1/2 ppd cigarettes x 20years. No alcohol. No other illicit drugs     Allergies  No Known Allergies          GEN: NAD, pleasant, cooperative    NEURO:   MENTAL STATUS: AAOx3  LANG/SPEECH: Fluent, intact naming, repetition & comprehension  CRANIAL NERVES:  II: Pupils equal round and reactive, normal visual fields  III, IV, VI: EOM intact, no gaze preference or deviation  V: normal  VII: no facial asymmetry  VIII: normal hearing to speech  MOTOR: 5/5 in both upper and lower extremities  REFLEXES: 3+ bilateral patella/ Achilles; 2+ bilateral biceps. Muted babinski   SENSORY: decreased to light touch L L4-L5  COORD: Normal finger to nose and heel to shin, no tremor, no dysmetria      LABS:                        12.4   4.62  )-----------( 224      ( 21 Oct 2024 06:08 )             38.0     10-21    141  |  106  |  12  ----------------------------<  126[H]  3.4[L]   |  27  |  0.7    Ca    8.7      21 Oct 2024 06:08    TPro  5.8[L]  /  Alb  3.9  /  TBili  0.3  /  DBili  x   /  AST  10  /  ALT  13  /  AlkPhos  66  10-21    Hemoglobin A1C:   Vitamin B12     CAPILLARY BLOOD GLUCOSE          Urinalysis Basic - ( 21 Oct 2024 06:08 )    Color: x / Appearance: x / SG: x / pH: x  Gluc: 126 mg/dL / Ketone: x  / Bili: x / Urobili: x   Blood: x / Protein: x / Nitrite: x   Leuk Esterase: x / RBC: x / WBC x   Sq Epi: x / Non Sq Epi: x / Bacteria: x        RADIOLOGY    < from: CT Head No Cont (10.20.24 @ 17:13) >  IMPRESSION:  1.  No definite evidence of acute intracranial hemorrhage.  2.  Nonspecific focus of hypoattenuation in the left inferior occipital   lobe possible prominent vascular space or prior lacunar infarct however   given patient's age again remains nonspecific. If symptoms persist   consider outpatient nonemergent brain MRI for further evaluation.    --- End of Report ---    < end of copied text >

## 2024-10-22 VITALS
RESPIRATION RATE: 18 BRPM | HEART RATE: 65 BPM | SYSTOLIC BLOOD PRESSURE: 127 MMHG | TEMPERATURE: 98 F | DIASTOLIC BLOOD PRESSURE: 76 MMHG | OXYGEN SATURATION: 98 %

## 2024-10-22 PROCEDURE — 95720 EEG PHY/QHP EA INCR W/VEEG: CPT

## 2024-10-22 PROCEDURE — 99233 SBSQ HOSP IP/OBS HIGH 50: CPT

## 2024-10-22 PROCEDURE — 99239 HOSP IP/OBS DSCHRG MGMT >30: CPT

## 2024-10-22 PROCEDURE — 99232 SBSQ HOSP IP/OBS MODERATE 35: CPT

## 2024-10-22 RX ORDER — BUPRENORPHINE HYDROCHLORIDE, NALOXONE HYDROCHLORIDE 12; 3 MG/1; MG/1
2 FILM, SOLUBLE BUCCAL; SUBLINGUAL
Refills: 0 | DISCHARGE

## 2024-10-22 RX ORDER — HEPARIN SODIUM 10000 [USP'U]/ML
5000 INJECTION INTRAVENOUS; SUBCUTANEOUS EVERY 12 HOURS
Refills: 0 | Status: DISCONTINUED | OUTPATIENT
Start: 2024-10-22 | End: 2024-10-22

## 2024-10-22 RX ORDER — POTASSIUM CHLORIDE 10 MEQ
40 TABLET, EXTENDED RELEASE ORAL ONCE
Refills: 0 | Status: COMPLETED | OUTPATIENT
Start: 2024-10-22 | End: 2024-10-22

## 2024-10-22 RX ADMIN — Medication 650 MILLIGRAM(S): at 00:15

## 2024-10-22 RX ADMIN — ESCITALOPRAM 10 MILLIGRAM(S): 10 TABLET, FILM COATED ORAL at 12:14

## 2024-10-22 RX ADMIN — ARIPIPRAZOLE 5 MILLIGRAM(S): 2 TABLET ORAL at 12:14

## 2024-10-22 RX ADMIN — Medication 650 MILLIGRAM(S): at 13:14

## 2024-10-22 RX ADMIN — Medication 40 MILLIEQUIVALENT(S): at 13:10

## 2024-10-22 RX ADMIN — Medication 650 MILLIGRAM(S): at 09:31

## 2024-10-22 RX ADMIN — BUPRENORPHINE HYDROCHLORIDE, NALOXONE HYDROCHLORIDE 1 TABLET(S): 12; 3 FILM, SOLUBLE BUCCAL; SUBLINGUAL at 05:53

## 2024-10-22 RX ADMIN — GABAPENTIN 600 MILLIGRAM(S): 300 CAPSULE ORAL at 05:52

## 2024-10-22 RX ADMIN — GABAPENTIN 600 MILLIGRAM(S): 300 CAPSULE ORAL at 13:10

## 2024-10-22 NOTE — PROGRESS NOTE ADULT - ASSESSMENT
34y F PMH opioid use disorder, depression, lumbar radiculopathy presented to ED for right temporal headache, as well as significant lethargy and episodes of "falling asleep while standing" over the last two days. CTH reports non specific focus of hypoattenuation in L inferior occipital lobe. Utox negative. Symptoms have now resolved. Neuro exam non focal. Etiology of symptoms atypical seizures (with wellbutrin lowering seizure threshold) v toxic encephalopathy from medication changes. vEEG report pending.      34y F PMH opioid use disorder, depression, lumbar radiculopathy presented to ED for right temporal headache, as well as significant lethargy and episodes of "falling asleep while standing" over the last two days. CTH reports non specific focus of hypoattenuation in L inferior occipital lobe. Utox negative. Symptoms have now resolved. Neuro exam non focal. vEEG reports no seizures or interictal activity, but borderline slowing over left hemisphere. Etiology of symptoms atypical seizures (with wellbutrin lowering seizure threshold) v toxic encephalopathy from medication changes.     Recommendations  - Suggested continue vEEG monitoring but patient and patient's mother refusing. Aware risks.  - Suggested MRI Brain but patient refusing continued inpatient treatment   - Follow up with epileptologist outpatient  - Medical management per primary team    Discussed with attending Dr Pitts      34y F PMH opioid use disorder, depression, lumbar radiculopathy presented to ED for right temporal headache, as well as significant lethargy and episodes of "falling asleep while standing" over the last two days. CTH reports non specific focus of hypoattenuation in L inferior occipital lobe. Utox negative. Symptoms have now resolved. Neuro exam non focal. vEEG reports no seizures or interictal activity, but borderline slowing over left hemisphere. Etiology of symptoms atypical seizures (with wellbutrin lowering seizure threshold) v toxic encephalopathy from medication changes.     Recommendations  - Suggested continue vEEG monitoring but patient and patient's mother refusing. Aware risks.  - Suggested MRI Brain but patient refusing continued inpatient treatment. Aware risks  - Seizure precautions discussed   - Follow up with epileptologist outpatient  - Medical management per primary team    Discussed with attending Dr Pitts

## 2024-10-22 NOTE — DISCHARGE NOTE PROVIDER - NSDCMRMEDTOKEN_GEN_ALL_CORE_FT
Abilify 5 mg oral tablet: 1 tab(s) orally once a day  gabapentin 300 mg oral tablet: 2 tab(s) orally 3 times a day  Lexapro 10 mg oral tablet: 1 tab(s) orally once a day  Suboxone 8 mg-2 mg sublingual film: 1 film(s) sublingually 2 times a day

## 2024-10-22 NOTE — EEG REPORT - NS EEG TEXT BOX
Epilepsy Attending Note:     CHARLENE BALDWIN    34y Female  MRN [id number]    Vital Signs Last 24 Hrs  T(C): 36.7 (22 Oct 2024 05:29), Max: 37.2 (21 Oct 2024 13:57)  T(F): 98.1 (22 Oct 2024 05:29), Max: 98.9 (21 Oct 2024 13:57)  HR: 78 (22 Oct 2024 05:29) (71 - 80)  BP: 99/61 (22 Oct 2024 05:29) (99/61 - 108/66)  BP(mean): --  RR: 18 (22 Oct 2024 05:29) (18 - 18)  SpO2: 96% (22 Oct 2024 05:29) (95% - 97%)    Parameters below as of 22 Oct 2024 05:29  Patient On (Oxygen Delivery Method): room air                              12.4   4.62  )-----------( 224      ( 21 Oct 2024 06:08 )             38.0       10-    141  |  106  |  12  ----------------------------<  126[H]  3.4[L]   |  27  |  0.7    Ca    8.7      21 Oct 2024 06:08    TPro  5.8[L]  /  Alb  3.9  /  TBili  0.3  /  DBili  x   /  AST  10  /  ALT  13  /  AlkPhos  66  10-21      MEDICATIONS  (STANDING):  ARIPiprazole 5 milliGRAM(s) Oral daily  buprenorphine 8 mG/naloxone 2 mG SL  Tablet 1 Tablet(s) SubLingual two times a day  escitalopram 10 milliGRAM(s) Oral daily  gabapentin 600 milliGRAM(s) Oral three times a day  heparin   Injectable 5000 Unit(s) SubCutaneous every 12 hours  potassium chloride    Tablet ER 40 milliEquivalent(s) Oral once  sodium chloride 0.9% 1000 milliLiter(s) IV Continuous     MEDICATIONS  (PRN):  acetaminophen     Tablet .. 650 milliGRAM(s) Oral every 6 hours PRN Temp greater or equal to 38C (100.4F), Mild Pain (1 - 3)  aluminum hydroxide/magnesium hydroxide/simethicone Suspension 30 milliLiter(s) Oral every 4 hours PRN Dyspepsia  LORazepam   Injectable 2 milliGRAM(s) IV Push three times a day PRN generalized tonic-clonic seizure lasting longer than 2 minutes, or two consecutive seizures without return to the baseline in-between  melatonin 5 milliGRAM(s) Oral at bedtime PRN Insomnia  ondansetron Injectable 4 milliGRAM(s) IV Push every 8 hours PRN Nausea and/or Vomiting            VEEG in the last 24 hours:    Background - continuous, symmetrical, well organized, reaching frequencies in the range of 8-9 Hz with normal sleep patterns, showing good reactivity    Focal and generalized slowin. no generalized slowing  2. borderline focal slowing over left hemisphere, mainly FC region seen mostly during drowsiness    Interictal activity - none    Events - none    Seizures - none    Impression: VEEG as above    Plan -   Continue VEEG  Will do HV/PS  Sleep deprive today

## 2024-10-22 NOTE — PROGRESS NOTE ADULT - NS ATTEND AMEND GEN_ALL_CORE FT
Patient discussed and seen with the PA. All pertinent labs, medications, images, notes, vitals reviewed personally. I agree with the above assessment and plan, which I have reviewed and edited.

## 2024-10-22 NOTE — DISCHARGE NOTE PROVIDER - HOSPITAL COURSE
# R/O Seizures.  Headache  # Substance Dependence  # Depression    a 34-year-old female with past history of opioid use disorder on Suboxone, hx of OD in June 2024, depression, presents for evaluation of right-sided temporal headache.  She also admits to strange episodes of "sleeping "providing video requiring to be aroused and awoken by her significant other.  Pt's partner provided video footage from In-House cameras.   Pt stands there and stares.  No loss of balance, no loss of bowel or bladder.  No LOC.  She denies any acute trauma, injury, fever, cough, sore throat, chills, nausea, vomiting, chest pain, shortness of breath, abdominal pain, urinary complaints.   - CT  Head No definite evidence of acute intracranial hemorrhage. Nonspecific focus of hypoattenuation in the left inferior occipital lobe possible prominent vascular space or prior lacunar infarct however   given patient's age again remains nonspecific.   - Neuro consult recs appre. recs for MRI outpatient   - Follow up vEEG     plan:  - Follow up vEEG   - Ativan PRN for seizure lasting >2min  - Urine drug screen  - On Suboxone  - Continue Iv fluids as needed  - consulted Neurology Rec's appr.   - PT/rehab Rec's appre  - Tylenol and pain management   - continue current medication, antihypertensive (with holding parameters),  - monitor vitals closely, daily am labs  - precaution (fall/aspiration/seizure)  - on 10/22 patient removed electrodes from head and terminated testing prematurely.  Patient decided to leave facility AMA.  Patient left before I was able to speak iwth her at the bedside.  She was aware of risks of AMA discharge and early termination of testing per RN.

## 2024-10-22 NOTE — PROGRESS NOTE ADULT - SUBJECTIVE AND OBJECTIVE BOX
CHARLENE BALDWIN 34y Female  MRN#: [id number]   Hospital Day: 1d    SUBJECTIVE  Patient is a 34y old Female who presents with a chief complaint of Currently admitted to medicine with the primary diagnosis of Partial seizure with impaired consciousness    INTERVAL HPI AND OVERNIGHT EVENTS:  Patient was examined and seen at bedside. This morning she is resting comfortably in bed and reports no issues or overnight events.  poor appetite, feels much better, denies chest pain , sob, n/v/d/c, hematuria or bloody stool.     c/w v EEG    REVIEW OF SYMPTOMS:  10 point ROS negative except as above.    OBJECTIVE  PAST MEDICAL & SURGICAL HISTORY  Substance abuse    No significant past surgical history      ALLERGIES:  No Known Allergies    MEDICATIONS:  STANDING MEDICATIONS  ARIPiprazole 5 milliGRAM(s) Oral daily  buprenorphine 8 mG/naloxone 2 mG SL  Tablet 1 Tablet(s) SubLingual two times a day  escitalopram 10 milliGRAM(s) Oral daily  gabapentin 600 milliGRAM(s) Oral three times a day  sodium chloride 0.9% 1000 milliLiter(s) IV Continuous     PRN MEDICATIONS  acetaminophen     Tablet .. 650 milliGRAM(s) Oral every 6 hours PRN  aluminum hydroxide/magnesium hydroxide/simethicone Suspension 30 milliLiter(s) Oral every 4 hours PRN  LORazepam   Injectable 2 milliGRAM(s) IV Push three times a day PRN  melatonin 5 milliGRAM(s) Oral at bedtime PRN  ondansetron Injectable 4 milliGRAM(s) IV Push every 8 hours PRN      VITAL SIGNS: Last 24 Hours  Vital Signs Last 24 Hrs  T(C): 36.7 (22 Oct 2024 05:29), Max: 37.2 (21 Oct 2024 13:57)  T(F): 98.1 (22 Oct 2024 05:29), Max: 98.9 (21 Oct 2024 13:57)  HR: 78 (22 Oct 2024 05:29) (71 - 80)  BP: 99/61 (22 Oct 2024 05:29) (99/61 - 108/66)  BP(mean): --  RR: 18 (22 Oct 2024 05:29) (18 - 18)  SpO2: 96% (22 Oct 2024 05:29) (95% - 97%)    Parameters below as of 22 Oct 2024 05:29  Patient On (Oxygen Delivery Method): room air      PHYSICAL EXAM:  GENERAL: NAD, well-groomed, well-developed  HEENT: NC/AT,   NECK: Supple, No JVD  CHEST/LUNG: Clear to auscultation bilaterally; No rales, rhonchi, wheezing  HEART: Regular rate and rhythm; No murmurs, rubs, or gallops  ABDOMEN: Soft, Nontender, Nondistended; Bowel sounds present  EXTREMITIES: no edema,   NEURO:  No Focal deficits, sensory and motor intact  SKIN: No rashes or lesions    LABS:                                12.4   4.62  )-----------( 224      ( 21 Oct 2024 06:08 )             38.0     10-21    141  |  106  |  12  ----------------------------<  126[H]  3.4[L]   |  27  |  0.7    Ca    8.7      21 Oct 2024 06:08    TPro  5.8[L]  /  Alb  3.9  /  TBili  0.3  /  DBili  x   /  AST  10  /  ALT  13  /  AlkPhos  66  10-21      Urinalysis Basic - ( 21 Oct 2024 06:08 )    Color: x / Appearance: x / SG: x / pH: x  Gluc: 126 mg/dL / Ketone: x  / Bili: x / Urobili: x   Blood: x / Protein: x / Nitrite: x   Leuk Esterase: x / RBC: x / WBC x   Sq Epi: x / Non Sq Epi: x / Bacteria: x      RADIOLOGY:    < from: CT Head No Cont (10.20.24 @ 17:13) >  IMPRESSION:  1.  No definite evidence of acute intracranial hemorrhage.  2.  Nonspecific focus of hypoattenuation in the left inferior occipital   lobe possible prominent vascular space or prior lacunar infarct however   given patient's age again remains nonspecific. If symptoms persist   consider outpatient nonemergent brain MRI for further evaluation.    < end of copied text >

## 2024-10-22 NOTE — DISCHARGE NOTE PROVIDER - NSDCCPCAREPLAN_GEN_ALL_CORE_FT
PRINCIPAL DISCHARGE DIAGNOSIS  Diagnosis: Partial seizure with impaired consciousness  Assessment and Plan of Treatment: Patient left AMA

## 2024-10-22 NOTE — PROGRESS NOTE ADULT - ASSESSMENT
# R/O Seizures.  Headache  # Substance Dependence  # Depression    a 34-year-old female with past history of opioid use disorder on Suboxone, hx of OD in June 2024, depression, presents for evaluation of right-sided temporal headache.  She also admits to strange episodes of "sleeping "providing video requiring to be aroused and awoken by her significant other.  Pt's partner provided video footage from In-House cameras.   Pt stands there and stares.  No loss of balance, no loss of bowel or bladder.  No LOC.  She denies any acute trauma, injury, fever, cough, sore throat, chills, nausea, vomiting, chest pain, shortness of breath, abdominal pain, urinary complaints.   - CT  Head No definite evidence of acute intracranial hemorrhage. Nonspecific focus of hypoattenuation in the left inferior occipital lobe possible prominent vascular space or prior lacunar infarct however   given patient's age again remains nonspecific.   - Neuro consult recs appre. recs for MRI outpatient   - Follow up vEEG     plan:  - Follow up vEEG   - Ativan PRN for seizure lasting >2min  - Urine drug screen  - On Suboxone  - Continue Iv fluids as needed  - consulted Neurology Rec's appr.   - PT/rehab Rec's appre  - Tylenol and pain management   - continue current medication, antihypertensive (with holding parameters),  - monitor vitals closely, daily am labs  - precaution (fall/aspiration/seizure)      DVT prophylaxis: low risk  GI prophylaxis: Protonix   diet: HH diet   code status: full code  Goals of care/disposition: Home     #Progress Note Handoff  Pending (specify):  v EEG and neurology clearance   Disposition: home

## 2024-10-22 NOTE — PROGRESS NOTE ADULT - SUBJECTIVE AND OBJECTIVE BOX
Neurology Progress Note    Interval History:  The patient was seen and examined at the bedside. She notes she feels much better than she had been. Lethargy is improved and she feels back to her baseline. Headache resolved. No new episodes.     History of Present Illness: 34-year-old female with past history of opioid use disorder on Suboxone, hx of OD in June 2024, depression, presents for evaluation of right-sided temporal headache.  She also admits to strange episodes of "sleeping "providing video requiring to be aroused and awoken by her significant other.  Pt's partner provided video footage from In-House cameras.   Pt stands there and stares.  No loss of balance, no loss of bowel or bladder.  No LOC.  She denies any acute trauma, injury, fever, cough, sore throat, chills, nausea, vomiting, chest pain, shortness of breath, abdominal pain, urinary complaints.      PAST MEDICAL & SURGICAL HISTORY:  Substance abuse    No significant past surgical history        Medications:  acetaminophen     Tablet .. 650 milliGRAM(s) Oral every 6 hours PRN  aluminum hydroxide/magnesium hydroxide/simethicone Suspension 30 milliLiter(s) Oral every 4 hours PRN  ARIPiprazole 5 milliGRAM(s) Oral daily  buprenorphine 8 mG/naloxone 2 mG SL  Tablet 1 Tablet(s) SubLingual two times a day  escitalopram 10 milliGRAM(s) Oral daily  gabapentin 600 milliGRAM(s) Oral three times a day  LORazepam   Injectable 2 milliGRAM(s) IV Push three times a day PRN  melatonin 5 milliGRAM(s) Oral at bedtime PRN  ondansetron Injectable 4 milliGRAM(s) IV Push every 8 hours PRN  sodium chloride 0.9% 1000 milliLiter(s) IV Continuous       Vital Signs Last 24 Hrs  T(C): 36.7 (22 Oct 2024 05:29), Max: 37.2 (21 Oct 2024 13:57)  T(F): 98.1 (22 Oct 2024 05:29), Max: 98.9 (21 Oct 2024 13:57)  HR: 78 (22 Oct 2024 05:29) (71 - 80)  BP: 99/61 (22 Oct 2024 05:29) (99/61 - 108/66)  BP(mean): --  RR: 18 (22 Oct 2024 05:29) (18 - 18)  SpO2: 96% (22 Oct 2024 05:29) (95% - 97%)    Parameters below as of 22 Oct 2024 05:29  Patient On (Oxygen Delivery Method): room air        Neurological Exam:   Mental status: Awake, alert and oriented x3.  Recent and remote memory intact.  Naming, repetition and comprehension intact.  Attention/concentration intact.  No dysarthria, no aphasia.  Fund of knowledge appropriate.    Cranial nerves: Pupils equally round and reactive to light, visual fields full, no nystagmus, extraocular muscles intact, V1 through V3 intact bilaterally and symmetric, face symmetric, hearing intact to finger rub, palate elevation symmetric, tongue was midline.  Motor:  MRC grading 5/5 b/l UE/LE.   strength 5/5.  Normal tone and bulk.  No abnormal movements.    Sensation: Intact to light touch, proprioception, and pinprick.   Coordination: No dysmetria on finger-to-nose and heel-to-shin.  No dysdiadokinesia.  Reflexes: 2+ in bilateral UE/LE, downgoing toes bilaterally. (-) Sloan.  Gait: Narrow and steady. No ataxia.  Romberg negative    Labs:  CBC Full  -  ( 21 Oct 2024 06:08 )  WBC Count : 4.62 K/uL  RBC Count : 4.09 M/uL  Hemoglobin : 12.4 g/dL  Hematocrit : 38.0 %  Platelet Count - Automated : 224 K/uL  Mean Cell Volume : 92.9 fL  Mean Cell Hemoglobin : 30.3 pg  Mean Cell Hemoglobin Concentration : 32.6 g/dL  Auto Neutrophil # : 2.11 K/uL  Auto Lymphocyte # : 1.95 K/uL  Auto Monocyte # : 0.35 K/uL  Auto Eosinophil # : 0.14 K/uL  Auto Basophil # : 0.06 K/uL  Auto Neutrophil % : 45.7 %  Auto Lymphocyte % : 42.2 %  Auto Monocyte % : 7.6 %  Auto Eosinophil % : 3.0 %  Auto Basophil % : 1.3 %    10-21    141  |  106  |  12  ----------------------------<  126[H]  3.4[L]   |  27  |  0.7    Ca    8.7      21 Oct 2024 06:08    TPro  5.8[L]  /  Alb  3.9  /  TBili  0.3  /  DBili  x   /  AST  10  /  ALT  13  /  AlkPhos  66  10-21    LIVER FUNCTIONS - ( 21 Oct 2024 06:08 )  Alb: 3.9 g/dL / Pro: 5.8 g/dL / ALK PHOS: 66 U/L / ALT: 13 U/L / AST: 10 U/L / GGT: x             Urinalysis Basic - ( 21 Oct 2024 06:08 )    Color: x / Appearance: x / SG: x / pH: x  Gluc: 126 mg/dL / Ketone: x  / Bili: x / Urobili: x   Blood: x / Protein: x / Nitrite: x   Leuk Esterase: x / RBC: x / WBC x   Sq Epi: x / Non Sq Epi: x / Bacteria: x   Neurology Progress Note    Interval History:  The patient was seen and examined at the bedside. She notes she feels much better than she had been. Lethargy is improved and she feels back to her baseline. Headache resolved. No new episodes. She wants to go home.     History of Present Illness: 34-year-old female with past history of opioid use disorder on Suboxone, hx of OD in June 2024, depression, presents for evaluation of right-sided temporal headache.  She also admits to strange episodes of "sleeping "providing video requiring to be aroused and awoken by her significant other.  Pt's partner provided video footage from In-House cameras.   Pt stands there and stares.  No loss of balance, no loss of bowel or bladder.  No LOC.  She denies any acute trauma, injury, fever, cough, sore throat, chills, nausea, vomiting, chest pain, shortness of breath, abdominal pain, urinary complaints.      PAST MEDICAL & SURGICAL HISTORY:  Substance abuse    No significant past surgical history        Medications:  acetaminophen     Tablet .. 650 milliGRAM(s) Oral every 6 hours PRN  aluminum hydroxide/magnesium hydroxide/simethicone Suspension 30 milliLiter(s) Oral every 4 hours PRN  ARIPiprazole 5 milliGRAM(s) Oral daily  buprenorphine 8 mG/naloxone 2 mG SL  Tablet 1 Tablet(s) SubLingual two times a day  escitalopram 10 milliGRAM(s) Oral daily  gabapentin 600 milliGRAM(s) Oral three times a day  LORazepam   Injectable 2 milliGRAM(s) IV Push three times a day PRN  melatonin 5 milliGRAM(s) Oral at bedtime PRN  ondansetron Injectable 4 milliGRAM(s) IV Push every 8 hours PRN  sodium chloride 0.9% 1000 milliLiter(s) IV Continuous       Vital Signs Last 24 Hrs  T(C): 36.7 (22 Oct 2024 05:29), Max: 37.2 (21 Oct 2024 13:57)  T(F): 98.1 (22 Oct 2024 05:29), Max: 98.9 (21 Oct 2024 13:57)  HR: 78 (22 Oct 2024 05:29) (71 - 80)  BP: 99/61 (22 Oct 2024 05:29) (99/61 - 108/66)  BP(mean): --  RR: 18 (22 Oct 2024 05:29) (18 - 18)  SpO2: 96% (22 Oct 2024 05:29) (95% - 97%)    Parameters below as of 22 Oct 2024 05:29  Patient On (Oxygen Delivery Method): room air        Neurological Exam:   Mental status: Awake, alert and oriented x3.  Recent and remote memory intact.  Naming, repetition and comprehension intact.  Attention/concentration intact.  No dysarthria, no aphasia.  Fund of knowledge appropriate.    Cranial nerves: Pupils equally round and reactive to light, visual fields full, no nystagmus, extraocular muscles intact, V1 through V3 intact bilaterally and symmetric, face symmetric, hearing intact to finger rub, palate elevation symmetric, tongue was midline.  Motor:  MRC grading 5/5 b/l UE/LE.   strength 5/5.  Normal tone and bulk.  No abnormal movements.    Sensation: Intact to light touch, proprioception, and pinprick.   Coordination: No dysmetria on finger-to-nose and heel-to-shin.  No dysdiadokinesia.  Reflexes: 2+ in bilateral UE/LE, downgoing toes bilaterally. (-) Sloan.  Gait: Narrow and steady. No ataxia.  Romberg negative    Labs:  CBC Full  -  ( 21 Oct 2024 06:08 )  WBC Count : 4.62 K/uL  RBC Count : 4.09 M/uL  Hemoglobin : 12.4 g/dL  Hematocrit : 38.0 %  Platelet Count - Automated : 224 K/uL  Mean Cell Volume : 92.9 fL  Mean Cell Hemoglobin : 30.3 pg  Mean Cell Hemoglobin Concentration : 32.6 g/dL  Auto Neutrophil # : 2.11 K/uL  Auto Lymphocyte # : 1.95 K/uL  Auto Monocyte # : 0.35 K/uL  Auto Eosinophil # : 0.14 K/uL  Auto Basophil # : 0.06 K/uL  Auto Neutrophil % : 45.7 %  Auto Lymphocyte % : 42.2 %  Auto Monocyte % : 7.6 %  Auto Eosinophil % : 3.0 %  Auto Basophil % : 1.3 %    10-21    141  |  106  |  12  ----------------------------<  126[H]  3.4[L]   |  27  |  0.7    Ca    8.7      21 Oct 2024 06:08    TPro  5.8[L]  /  Alb  3.9  /  TBili  0.3  /  DBili  x   /  AST  10  /  ALT  13  /  AlkPhos  66  10-21    LIVER FUNCTIONS - ( 21 Oct 2024 06:08 )  Alb: 3.9 g/dL / Pro: 5.8 g/dL / ALK PHOS: 66 U/L / ALT: 13 U/L / AST: 10 U/L / GGT: x             Urinalysis Basic - ( 21 Oct 2024 06:08 )    Color: x / Appearance: x / SG: x / pH: x  Gluc: 126 mg/dL / Ketone: x  / Bili: x / Urobili: x   Blood: x / Protein: x / Nitrite: x   Leuk Esterase: x / RBC: x / WBC x   Sq Epi: x / Non Sq Epi: x / Bacteria: x

## 2024-10-29 DIAGNOSIS — F11.20 OPIOID DEPENDENCE, UNCOMPLICATED: ICD-10-CM

## 2024-10-29 DIAGNOSIS — F17.210 NICOTINE DEPENDENCE, CIGARETTES, UNCOMPLICATED: ICD-10-CM

## 2024-10-29 DIAGNOSIS — R56.9 UNSPECIFIED CONVULSIONS: ICD-10-CM

## 2024-10-29 DIAGNOSIS — Z53.29 PROCEDURE AND TREATMENT NOT CARRIED OUT BECAUSE OF PATIENT'S DECISION FOR OTHER REASONS: ICD-10-CM

## 2024-10-29 DIAGNOSIS — F32.A DEPRESSION, UNSPECIFIED: ICD-10-CM

## 2024-11-19 NOTE — H&P ADULT - NSHPPHYSICALEXAM_GEN_ALL_CORE
Pharmacist calling for diclofenac-ketamine-lidocaine 3-10-5 % cream . Patient is traveling and will need 240 Grams so that she can take them with her on her trip. Per pharmacist, it is controlled and need prescription order to dispense 240 grams.  
Vital Signs (24 Hrs):  T(C): 36.7 (06-15-24 @ 05:57), Max: 36.7 (06-15-24 @ 05:57)  HR: 71 (06-15-24 @ 09:39) (65 - 73)  BP: 118/62 (06-15-24 @ 09:39) (118/62 - 163/92)  RR: 12 (06-15-24 @ 10:14) (8 - 12)  SpO2: 99% (06-15-24 @ 10:14) (90% - 100%)    PHYSICAL EXAM:  GENERAL: NAD, well-developed  SKIN: + abrasion to left cheek around and right chin  HEAD:  Atraumatic, Normocephalic  EYES: EOMI, PERRLA, conjunctiva and sclera clear  NECK: Supple, No JVD  CHEST/LUNG: Clear to auscultation bilaterally; No wheeze  HEART: Regular rate and rhythm; No murmurs, rubs, or gallops  ABDOMEN: Soft, Nontender, Nondistended; Bowel sounds present  EXTREMITIES:  No clubbing, cyanosis, or edema  CNS: AAOx3.  Drowsy but arousable

## 2025-03-12 NOTE — ED ADULT TRIAGE NOTE - LOCATION:
13.2* 11.2*   HGB 10.7* 12.0   HCT 36.1 40.4    384     Recent Labs     03/10/25  0352 03/11/25  0729 03/11/25  1535 03/12/25  0329     --  142 144   K 4.2  --  4.1 3.9   *  --  111* 114*   CO2 25  --  24 24   GLUCOSE 176*  --  291* 206*   BUN 20  --  19 17   CREATININE 0.77  --  0.80 0.87   CALCIUM 8.1*  --  8.5 8.6   MG 2.0 2.0  --   --    PHOS 2.1* 3.0 2.7 2.9       Signed: Lio Rodriguez MD           Right arm;

## 2025-05-25 NOTE — PATIENT PROFILE ADULT - FALL HARM RISK - HARM RISK INTERVENTIONS
Statement Selected
Assistance OOB with selected safe patient handling equipment/Communicate Risk of Fall with Harm to all staff/Discuss with provider need for PT consult/Monitor gait and stability/Provide patient with walking aids - walker, cane, crutches/Reinforce activity limits and safety measures with patient and family/Tailored Fall Risk Interventions/Visual Cue: Yellow wristband and red socks/Bed in lowest position, wheels locked, appropriate side rails in place/Call bell, personal items and telephone in reach/Instruct patient to call for assistance before getting out of bed or chair/Non-slip footwear when patient is out of bed/[place] to call system/Physically safe environment - no spills, clutter or unnecessary equipment/Purposeful Proactive Rounding/Room/bathroom lighting operational, light cord in reach

## 2025-05-27 NOTE — ED ADULT NURSE NOTE - NS_NURSE_DISC_TEACHING_YN_ED_ALL_ED
Patient informed that Dr Soriano can no longer be PCP - new pcp info given - Dr Garay - patient verbalized understanding    Yes

## 2025-06-09 ENCOUNTER — APPOINTMENT (OUTPATIENT)
Dept: AFTER HOURS CARE | Facility: EMERGENCY ROOM | Age: 35
End: 2025-06-09
Payer: MEDICAID

## 2025-06-09 ENCOUNTER — TRANSCRIPTION ENCOUNTER (OUTPATIENT)
Age: 35
End: 2025-06-09

## 2025-06-09 PROCEDURE — 99215 OFFICE O/P EST HI 40 MIN: CPT | Mod: 95

## 2025-06-09 RX ORDER — AZITHROMYCIN 250 MG/1
250 TABLET, FILM COATED ORAL
Qty: 1 | Refills: 0 | Status: ACTIVE | COMMUNITY
Start: 2025-06-09 | End: 1900-01-01

## 2025-06-09 RX ORDER — IBUPROFEN 600 MG/1
600 TABLET, FILM COATED ORAL 4 TIMES DAILY
Qty: 30 | Refills: 0 | Status: ACTIVE | COMMUNITY
Start: 2025-06-09 | End: 1900-01-01

## 2025-06-09 RX ORDER — ALBUTEROL SULFATE 90 UG/1
108 (90 BASE) INHALANT RESPIRATORY (INHALATION)
Qty: 1 | Refills: 0 | Status: ACTIVE | COMMUNITY
Start: 2025-06-09 | End: 1900-01-01

## 2025-06-09 RX ORDER — PREDNISONE 50 MG/1
50 TABLET ORAL DAILY
Qty: 5 | Refills: 0 | Status: ACTIVE | COMMUNITY
Start: 2025-06-09 | End: 1900-01-01